# Patient Record
Sex: MALE | Race: WHITE | Employment: PART TIME | URBAN - METROPOLITAN AREA
[De-identification: names, ages, dates, MRNs, and addresses within clinical notes are randomized per-mention and may not be internally consistent; named-entity substitution may affect disease eponyms.]

---

## 2017-01-31 ENCOUNTER — ANTICOAGULATION THERAPY VISIT (OUTPATIENT)
Dept: ANTICOAGULATION | Facility: CLINIC | Age: 62
End: 2017-01-31
Payer: COMMERCIAL

## 2017-01-31 DIAGNOSIS — Z95.2 HEART VALVE REPLACED: ICD-10-CM

## 2017-01-31 DIAGNOSIS — Z79.01 LONG-TERM (CURRENT) USE OF ANTICOAGULANTS: Primary | ICD-10-CM

## 2017-01-31 LAB — INR POINT OF CARE: 2.5 (ref 0.86–1.14)

## 2017-01-31 PROCEDURE — 36416 COLLJ CAPILLARY BLOOD SPEC: CPT

## 2017-01-31 PROCEDURE — 85610 PROTHROMBIN TIME: CPT | Mod: QW

## 2017-01-31 PROCEDURE — 99207 ZZC NO CHARGE NURSE ONLY: CPT

## 2017-01-31 NOTE — PROGRESS NOTES
ANTICOAGULATION FOLLOW-UP CLINIC VISIT    Patient Name:  Matthias Cueto  Date:  1/31/2017  Contact Type:  Face to Face    SUBJECTIVE:     Patient Findings     Positives No Problem Findings           OBJECTIVE    INR PROTIME   Date Value Ref Range Status   01/31/2017 2.5* 0.86 - 1.14 Final       ASSESSMENT / PLAN  INR assessment THER    Recheck INR In: 6 WEEKS    INR Location Clinic      Anticoagulation Summary as of 1/31/2017     INR goal 2.5-3.5   Selected INR 2.5 (1/31/2017)   Maintenance plan 3.75 mg (7.5 mg x 0.5) on Tue, Sat; 7.5 mg (7.5 mg x 1) all other days   Full instructions 3.75 mg on Tue, Sat; 7.5 mg all other days   Weekly total 45 mg   No change documented Lina Benito RN   Plan last modified Lina Benito RN (4/18/2016)   Next INR check 3/14/2017   Priority INR   Target end date     Indications   Long-term (current) use of anticoagulants [Z79.01] [Z79.01]  Heart valve replaced [Z95.2] [Z95.2]         Anticoagulation Episode Summary     INR check location     Preferred lab     Send INR reminders to [facility]    Comments       Anticoagulation Care Providers     Provider Role Specialty Phone number    Porfirio Sprague MD Responsible Internal Medicine [phone]            See the Encounter Report to view Anticoagulation Flowsheet and Dosing Calendar (Go to Encounters tab in chart review, and find the Anticoagulation Therapy Visit)        Lina Benito, RN

## 2017-01-31 NOTE — MR AVS SNAPSHOT
Matthias Cueto   1/31/2017 7:15 AM   Anticoagulation Therapy Visit    Description:  61 year old male   Provider[facility]  [facility]   Department:  [facility]           INR as of 1/31/2017     Selected INR 2.5 (1/31/2017)      Anticoagulation Summary as of 1/31/2017     INR goal 2.5-3.5   Selected INR 2.5 (1/31/2017)   Full instructions 3.75 mg on Tue, Sat; 7.5 mg all other days   Next INR check 3/14/2017    Indications   Long-term (current) use of anticoagulants [Z79.01] [Z79.01]  Heart valve replaced [Z95.2] [Z95.2]         Your next Anticoagulation Clinic appointment(s)     Mar 14, 2017  7:15 AM   [facility] Visit with [facility]   [facility] ([facility])    [address] [facility] [address]  [facility] [id number] [phone]              Contact Numbers     [facility] Phone Numbers:  Anticoagulation Clinic Appointments : [phone]  Anticoagulation Nurse: [phone]         January 2017 Details    Sun Mon Tue Wed Thu Fri Sat     1               2               3               4               5               6               7                 8               9               10               11               12               13               14                 15               16               17               18               19               20               21                 22               23               24               25               26               27               28                 29               30               31      3.75 mg   See details           Date Details   01/31 This INR check               How to take your warfarin dose     To take:  3.75 mg Take 0.5 of a 7.5 mg tablet.           February 2017 Details    Sun Mon Tue Wed Thu Fri Sat        1      7.5 mg         2      7.5 mg         3      7.5 mg         4      3.75 mg           5      7.5 mg         6      7.5 mg         7      3.75 mg         8       7.5 mg         9      7.5 mg         10      7.5 mg         11      3.75 mg           12      7.5 mg         13      7.5 mg         14      3.75 mg         15      7.5 mg         16      7.5 mg         17      7.5 mg         18      3.75 mg           19      7.5 mg         20      7.5 mg         21      3.75 mg         22      7.5 mg         23      7.5 mg         24      7.5 mg         25      3.75 mg           26      7.5 mg         27      7.5 mg         28      3.75 mg              Date Details   No additional details            How to take your warfarin dose     To take:  3.75 mg Take 0.5 of a 7.5 mg tablet.    To take:  7.5 mg Take 1 of the 7.5 mg tablets.           March 2017 Details    Sun Mon Tue Wed Thu Fri Sat        1      7.5 mg         2      7.5 mg         3      7.5 mg         4      3.75 mg           5      7.5 mg         6      7.5 mg         7      3.75 mg         8      7.5 mg         9      7.5 mg         10      7.5 mg         11      3.75 mg           12      7.5 mg         13      7.5 mg         14            15               16               17               18                 19               20               21               22               23               24               25                 26               27               28               29               30               31                 Date Details   No additional details    Date of next INR:  3/14/2017         How to take your warfarin dose     To take:  3.75 mg Take 0.5 of a 7.5 mg tablet.    To take:  7.5 mg Take 1 of the 7.5 mg tablets.

## 2017-03-14 ENCOUNTER — ANTICOAGULATION THERAPY VISIT (OUTPATIENT)
Dept: ANTICOAGULATION | Facility: CLINIC | Age: 62
End: 2017-03-14
Payer: COMMERCIAL

## 2017-03-14 DIAGNOSIS — Z79.01 LONG-TERM (CURRENT) USE OF ANTICOAGULANTS: ICD-10-CM

## 2017-03-14 DIAGNOSIS — Z95.2 HEART VALVE REPLACED: ICD-10-CM

## 2017-03-14 LAB — INR POINT OF CARE: 3.9 (ref 0.86–1.14)

## 2017-03-14 PROCEDURE — 36416 COLLJ CAPILLARY BLOOD SPEC: CPT

## 2017-03-14 PROCEDURE — 85610 PROTHROMBIN TIME: CPT | Mod: QW

## 2017-03-14 PROCEDURE — 99207 ZZC NO CHARGE NURSE ONLY: CPT

## 2017-03-14 NOTE — PROGRESS NOTES
ANTICOAGULATION FOLLOW-UP CLINIC VISIT    Patient Name:  Matthias Cueto  Date:  3/14/2017  Contact Type:  Face to Face    SUBJECTIVE:     Patient Findings     Positives Change in diet/appetite (Pt reports decreased green intake this week, will resume usual diet)           OBJECTIVE    INR Protime   Date Value Ref Range Status   03/14/2017 3.9 (A) 0.86 - 1.14 Final       ASSESSMENT / PLAN  INR assessment SUPRA    Recheck INR In: 3 WEEKS    INR Location Clinic      Anticoagulation Summary as of 3/14/2017     INR goal 2.5-3.5   Today's INR 3.9!   Maintenance plan 3.75 mg (7.5 mg x 0.5) on Tue, Sat; 7.5 mg (7.5 mg x 1) all other days   Full instructions 3.75 mg on Tue, Sat; 7.5 mg all other days   Weekly total 45 mg   No change documented Lina Benito RN   Plan last modified Lina Benito RN (4/18/2016)   Next INR check 4/4/2017   Priority INR   Target end date     Indications   Long-term (current) use of anticoagulants [Z79.01] [Z79.01]  Heart valve replaced [Z95.2] [Z95.2]         Anticoagulation Episode Summary     INR check location     Preferred lab     Send INR reminders to [facility]    Comments       Anticoagulation Care Providers     Provider Role Specialty Phone number    Porfirio Sprague MD [facility] Internal Medicine [phone]            See the Encounter Report to view Anticoagulation Flowsheet and Dosing Calendar (Go to Encounters tab in chart review, and find the Anticoagulation Therapy Visit)        Lina Benito RN

## 2017-03-14 NOTE — MR AVS SNAPSHOT
Matthias Cueto   3/14/2017 7:15 AM   Anticoagulation Therapy Visit    Description:  61 year old male   Provider[facility]  [facility]   Department:  [facility]           INR as of 3/14/2017     Today's INR 3.9!      Anticoagulation Summary as of 3/14/2017     INR goal 2.5-3.5   Today's INR 3.9!   Full instructions 3.75 mg on Tue, Sat; 7.5 mg all other days   Next INR check 4/4/2017    Indications   Long-term (current) use of anticoagulants [Z79.01] [Z79.01]  Heart valve replaced [Z95.2] [Z95.2]         Your next Anticoagulation Clinic appointment(s)     Apr 04, 2017  7:15 AM CDT   [facility] with [facility]   [facility]    [address] [facility] [address]  [facility] [address] [phone]              Contact Numbers     [facility] Phone Numbers:  Anticoagulation Clinic Appointments : [phone]  Anticoagulation Nurse: [phone]         March 2017 Details    Sun Mon Tue Wed Thu Fri Sat        1               2               3               4                 5               6               7               8               9               10               11                 12               13               14      3.75 mg   See details      15      7.5 mg         16      7.5 mg         17      7.5 mg         18      3.75 mg           19      7.5 mg         20      7.5 mg         21      3.75 mg         22      7.5 mg         23      7.5 mg         24      7.5 mg         25      3.75 mg           26      7.5 mg         27      7.5 mg         28      3.75 mg         29      7.5 mg         30      7.5 mg         31      7.5 mg           Date Details   03/14 This INR check               How to take your warfarin dose     To take:  3.75 mg Take 0.5 of a 7.5 mg tablet.    To take:  7.5 mg Take 1 of the 7.5 mg tablets.           April 2017 Details    Sun Mon Tue Wed Thu Fri Sat           1      3.75 mg           2       7.5 mg         3      7.5 mg         4            5               6               7               8                 9               10               11               12               13               14               15                 16               17               18               19               20               21               22                 23               24               25               26               27               28               29                 30                      Date Details   No additional details    Date of next INR:  4/4/2017         How to take your warfarin dose     To take:  3.75 mg Take 0.5 of a 7.5 mg tablet.    To take:  7.5 mg Take 1 of the 7.5 mg tablets.

## 2017-04-04 ENCOUNTER — ANTICOAGULATION THERAPY VISIT (OUTPATIENT)
Dept: ANTICOAGULATION | Facility: CLINIC | Age: 62
End: 2017-04-04
Payer: COMMERCIAL

## 2017-04-04 DIAGNOSIS — Z79.01 LONG-TERM (CURRENT) USE OF ANTICOAGULANTS: ICD-10-CM

## 2017-04-04 DIAGNOSIS — Z95.2 HEART VALVE REPLACED: ICD-10-CM

## 2017-04-04 LAB — INR POINT OF CARE: 2.7 (ref 0.86–1.14)

## 2017-04-04 PROCEDURE — 99207 ZZC NO CHARGE NURSE ONLY: CPT

## 2017-04-04 PROCEDURE — 85610 PROTHROMBIN TIME: CPT | Mod: QW

## 2017-04-04 PROCEDURE — 36416 COLLJ CAPILLARY BLOOD SPEC: CPT

## 2017-04-04 NOTE — PROGRESS NOTES
ANTICOAGULATION FOLLOW-UP CLINIC VISIT    Patient Name:  Matthias Cueto  Date:  4/4/2017  Contact Type:  Face to Face    SUBJECTIVE:     Patient Findings     Positives No Problem Findings           OBJECTIVE    INR Protime   Date Value Ref Range Status   04/04/2017 2.7 (A) 0.86 - 1.14 Final       ASSESSMENT / PLAN  INR assessment THER    Recheck INR In: 6 WEEKS    INR Location Clinic      Anticoagulation Summary as of 4/4/2017     INR goal 2.5-3.5   Today's INR 2.7   Maintenance plan 3.75 mg (7.5 mg x 0.5) on Tue, Sat; 7.5 mg (7.5 mg x 1) all other days   Full instructions 3.75 mg on Tue, Sat; 7.5 mg all other days   Weekly total 45 mg   No change documented Lina Benito RN   Plan last modified Lina Benito RN (4/18/2016)   Next INR check 5/16/2017   Priority INR   Target end date     Indications   Long-term (current) use of anticoagulants [Z79.01] [Z79.01]  Heart valve replaced [Z95.2] [Z95.2]         Anticoagulation Episode Summary     INR check location     Preferred lab     Send INR reminders to [facility]    Comments       Anticoagulation Care Providers     Provider Role Specialty Phone number    Porfirio Sprague MD [facility] Internal Medicine [phone]            See the Encounter Report to view Anticoagulation Flowsheet and Dosing Calendar (Go to Encounters tab in chart review, and find the Anticoagulation Therapy Visit)        Lina Benito RN

## 2017-04-04 NOTE — MR AVS SNAPSHOT
Matthias Cueto   4/4/2017 7:15 AM   Anticoagulation Therapy Visit    Description:  61 year old male   Provider[facility]  [facility]   Department:  [facility]           INR as of 4/4/2017     Today's INR 2.7      Anticoagulation Summary as of 4/4/2017     INR goal 2.5-3.5   Today's INR 2.7   Full instructions 3.75 mg on Tue, Sat; 7.5 mg all other days   Next INR check 5/16/2017    Indications   Long-term (current) use of anticoagulants [Z79.01] [Z79.01]  Heart valve replaced [Z95.2] [Z95.2]         Your next Anticoagulation Clinic appointment(s)     May 16, 2017  7:15 AM CDT   [facility] with [facility]   [facility]    [address] [facility] [address]  [facility] [address] [phone]              Contact Numbers     [facility] Phone Numbers:  Anticoagulation Clinic Appointments : [phone]  Anticoagulation Nurse: [phone]         April 2017 Details    Sun Mon Tue Wed Thu Fri Sat           1                 2               3               4      3.75 mg   See details      5      7.5 mg         6      7.5 mg         7      7.5 mg         8      3.75 mg           9      7.5 mg         10      7.5 mg         11      3.75 mg         12      7.5 mg         13      7.5 mg         14      7.5 mg         15      3.75 mg           16      7.5 mg         17      7.5 mg         18      3.75 mg         19      7.5 mg         20      7.5 mg         21      7.5 mg         22      3.75 mg           23      7.5 mg         24      7.5 mg         25      3.75 mg         26      7.5 mg         27      7.5 mg         28      7.5 mg         29      3.75 mg           30      7.5 mg                Date Details   04/04 This INR check               How to take your warfarin dose     To take:  3.75 mg Take 0.5 of a 7.5 mg tablet.    To take:  7.5 mg Take 1 of the 7.5 mg tablets.           May 2017 Details    Sun Mon Tue Wed Thu Fri Sat       1      7.5 mg         2      3.75 mg         3      7.5 mg         4      7.5 mg         5      7.5 mg         6      3.75 mg           7      7.5 mg         8      7.5 mg         9      3.75 mg         10      7.5 mg         11      7.5 mg         12      7.5 mg         13      3.75 mg           14      7.5 mg         15      7.5 mg         16            17               18               19               20                 21               22               23               24               25               26               27                 28               29               30               31                   Date Details   No additional details    Date of next INR:  5/16/2017         How to take your warfarin dose     To take:  3.75 mg Take 0.5 of a 7.5 mg tablet.    To take:  7.5 mg Take 1 of the 7.5 mg tablets.

## 2017-04-26 DIAGNOSIS — R25.1 TREMOR: ICD-10-CM

## 2017-04-26 DIAGNOSIS — E63.9 NUTRITION DISORDER: ICD-10-CM

## 2017-04-26 RX ORDER — GABAPENTIN 600 MG/1
600 TABLET ORAL 4 TIMES DAILY
Qty: 360 TABLET | Refills: 4 | Status: SHIPPED | OUTPATIENT
Start: 2017-04-26

## 2017-04-26 NOTE — TELEPHONE ENCOUNTER
Gabapentin      Last Written Prescription Date:  05/11/16  Last Fill Quantity: 360,   # refills: 4  Last Office Visit with [facility] or  Health prescribing provider: 06/06/16  Future Office visit:       Routing refill request to provider for review/approval because:  Drug not on the [facility] or [facility] refill protocol or controlled substance    Folbic      Last Written Prescription Date: 05/03/16  Last Fill Quantity: 90,  # refills: 4   Last Office Visit with [facility] or  [facility] prescribing provider: 06/06/16

## 2017-05-16 ENCOUNTER — ANTICOAGULATION THERAPY VISIT (OUTPATIENT)
Dept: ANTICOAGULATION | Facility: CLINIC | Age: 62
End: 2017-05-16
Payer: COMMERCIAL

## 2017-05-16 DIAGNOSIS — Z95.2 HEART VALVE REPLACED: ICD-10-CM

## 2017-05-16 DIAGNOSIS — I05.9 MITRAL VALVE DISORDER: ICD-10-CM

## 2017-05-16 DIAGNOSIS — Z79.01 LONG-TERM (CURRENT) USE OF ANTICOAGULANTS: ICD-10-CM

## 2017-05-16 LAB — INR POINT OF CARE: 3.3 (ref 0.86–1.14)

## 2017-05-16 PROCEDURE — 85610 PROTHROMBIN TIME: CPT | Mod: QW

## 2017-05-16 PROCEDURE — 36416 COLLJ CAPILLARY BLOOD SPEC: CPT

## 2017-05-16 PROCEDURE — 99207 ZZC NO CHARGE NURSE ONLY: CPT

## 2017-05-16 RX ORDER — WARFARIN SODIUM 7.5 MG/1
TABLET ORAL
Qty: 90 TABLET | Refills: 1 | Status: SHIPPED | OUTPATIENT
Start: 2017-05-16 | End: 2017-12-07

## 2017-05-16 NOTE — MR AVS SNAPSHOT
Matthias Cueto   5/16/2017 7:15 AM   Anticoagulation Therapy Visit    Description:  61 year old male   Provider[facility]  [facility]   Department:  [facility]           INR as of 5/16/2017     Today's INR 3.3      Anticoagulation Summary as of 5/16/2017     INR goal 2.5-3.5   Today's INR 3.3   Full instructions 3.75 mg on Tue, Sat; 7.5 mg all other days   Next INR check 6/27/2017    Indications   Long-term (current) use of anticoagulants [Z79.01] [Z79.01]  Heart valve replaced [Z95.2] [Z95.2]         Your next Anticoagulation Clinic appointment(s)     Jun 27, 2017  7:15 AM CDT   [facility] with [facility]   [facility]    [address] [facility] [address]  [facility] [address] [phone]              Contact Numbers     [facility] Phone Numbers:  Anticoagulation Clinic Appointments : [phone]  Anticoagulation Nurse: [phone]         May 2017 Details    Sun Mon Tue Wed Thu Fri Sat      1               2               3               4               5               6                 7               8               9               10               11               12               13                 14               15               16      3.75 mg   See details      17      7.5 mg         18      7.5 mg         19      7.5 mg         20      3.75 mg           21      7.5 mg         22      7.5 mg         23      3.75 mg         24      7.5 mg         25      7.5 mg         26      7.5 mg         27      3.75 mg           28      7.5 mg         29      7.5 mg         30      3.75 mg         31      7.5 mg             Date Details   05/16 This INR check               How to take your warfarin dose     To take:  3.75 mg Take 0.5 of a 7.5 mg tablet.    To take:  7.5 mg Take 1 of the 7.5 mg tablets.           June 2017 Details    Sun Mon Tue Wed Thu Fri Sat         1      7.5 mg         2      7.5 mg         3       3.75 mg           4      7.5 mg         5      7.5 mg         6      3.75 mg         7      7.5 mg         8      7.5 mg         9      7.5 mg         10      3.75 mg           11      7.5 mg         12      7.5 mg         13      3.75 mg         14      7.5 mg         15      7.5 mg         16      7.5 mg         17      3.75 mg           18      7.5 mg         19      7.5 mg         20      3.75 mg         21      7.5 mg         22      7.5 mg         23      7.5 mg         24      3.75 mg           25      7.5 mg         26      7.5 mg         27            28               29               30                 Date Details   No additional details    Date of next INR:  6/27/2017         How to take your warfarin dose     To take:  3.75 mg Take 0.5 of a 7.5 mg tablet.    To take:  7.5 mg Take 1 of the 7.5 mg tablets.

## 2017-05-16 NOTE — PROGRESS NOTES
ANTICOAGULATION FOLLOW-UP CLINIC VISIT    Patient Name:  Matthias Cueto  Date:  5/16/2017  Contact Type:  Face to Face    SUBJECTIVE:     Patient Findings     Positives No Problem Findings           OBJECTIVE    INR Protime   Date Value Ref Range Status   05/16/2017 3.3 (A) 0.86 - 1.14 Final       ASSESSMENT / PLAN  INR assessment THER    Recheck INR In: 6 WEEKS    INR Location Clinic      Anticoagulation Summary as of 5/16/2017     INR goal 2.5-3.5   Today's INR 3.3   Maintenance plan 3.75 mg (7.5 mg x 0.5) on Tue, Sat; 7.5 mg (7.5 mg x 1) all other days   Full instructions 3.75 mg on Tue, Sat; 7.5 mg all other days   Weekly total 45 mg   No change documented Lina Benito RN   Plan last modified Lina Benito RN (4/18/2016)   Next INR check 6/27/2017   Priority INR   Target end date     Indications   Long-term (current) use of anticoagulants [Z79.01] [Z79.01]  Heart valve replaced [Z95.2] [Z95.2]         Anticoagulation Episode Summary     INR check location     Preferred lab     Send INR reminders to [facility]    Comments       Anticoagulation Care Providers     Provider Role Specialty Phone number    Porfirio Sprague MD [facility] Internal Medicine [phone]            See the Encounter Report to view Anticoagulation Flowsheet and Dosing Calendar (Go to Encounters tab in chart review, and find the Anticoagulation Therapy Visit)        Lina Benito RN

## 2017-06-28 ENCOUNTER — ANTICOAGULATION THERAPY VISIT (OUTPATIENT)
Dept: ANTICOAGULATION | Facility: CLINIC | Age: 62
End: 2017-06-28
Payer: COMMERCIAL

## 2017-06-28 DIAGNOSIS — Z95.2 HEART VALVE REPLACED: ICD-10-CM

## 2017-06-28 DIAGNOSIS — Z79.01 LONG-TERM (CURRENT) USE OF ANTICOAGULANTS: ICD-10-CM

## 2017-06-28 LAB — INR POINT OF CARE: 3.6 (ref 0.86–1.14)

## 2017-06-28 PROCEDURE — 85610 PROTHROMBIN TIME: CPT | Mod: QW

## 2017-06-28 PROCEDURE — 36416 COLLJ CAPILLARY BLOOD SPEC: CPT

## 2017-06-28 PROCEDURE — 99207 ZZC NO CHARGE NURSE ONLY: CPT

## 2017-06-28 NOTE — PROGRESS NOTES
ANTICOAGULATION FOLLOW-UP CLINIC VISIT    Patient Name:  Matthias Cueto  Date:  6/28/2017  Contact Type:  Face to Face    SUBJECTIVE:     Patient Findings     Positives No Problem Findings           OBJECTIVE    INR Protime   Date Value Ref Range Status   06/28/2017 3.6 (A) 0.86 - 1.14 Final       ASSESSMENT / PLAN  INR assessment THER    Recheck INR In: 6 WEEKS    INR Location Clinic      Anticoagulation Summary as of 6/28/2017     INR goal 2.5-3.5   Today's INR 3.6!   Maintenance plan 3.75 mg (7.5 mg x 0.5) on Tue, Sat; 7.5 mg (7.5 mg x 1) all other days   Full instructions 3.75 mg on Tue, Sat; 7.5 mg all other days   Weekly total 45 mg   No change documented Ashleigh Lopez RN   Plan last modified Lina Benito RN (4/18/2016)   Next INR check 8/9/2017   Priority INR   Target end date     Indications   Long-term (current) use of anticoagulants [Z79.01] [Z79.01]  Heart valve replaced [Z95.2] [Z95.2]         Anticoagulation Episode Summary     INR check location     Preferred lab     Send INR reminders to [facility]    Comments       Anticoagulation Care Providers     Provider Role Specialty Phone number    Porfirio Sprague MD Responsible Internal Medicine [phone]            See the Encounter Report to view Anticoagulation Flowsheet and Dosing Calendar (Go to Encounters tab in chart review, and find the Anticoagulation Therapy Visit)    Dosage adjustment made based on physician directed care plan.    Ashleigh Lopez, RN

## 2017-06-28 NOTE — MR AVS SNAPSHOT
Matthias Cueto   6/28/2017 4:00 PM   Anticoagulation Therapy Visit    Description:  62 year old male   Provider[facility]  [facility]   Department:  [facility]           INR as of 6/28/2017     Today's INR 3.6!      Anticoagulation Summary as of 6/28/2017     INR goal 2.5-3.5   Today's INR 3.6!   Full instructions 3.75 mg on Tue, Sat; 7.5 mg all other days   Next INR check 8/9/2017    Indications   Long-term (current) use of anticoagulants [Z79.01] [Z79.01]  Heart valve replaced [Z95.2] [Z95.2]         Contact Numbers     [facility] Phone Numbers:  Anticoagulation Clinic Appointments : [phone]  Anticoagulation Nurse: [phone]         June 2017 Details    Sun Mon Tue Wed Thu Fri Sat         1               2               3                 4               5               6               7               8               9               10                 11               12               13               14               15               16               17                 18               19               20               21               22               23               24                 25               26               27               28      7.5 mg   See details      29      7.5 mg         30      7.5 mg           Date Details   06/28 This INR check               How to take your warfarin dose     To take:  7.5 mg Take 1 of the 7.5 mg tablets.           July 2017 Details    Sun Mon Tue Wed Thu Fri Sat           1      3.75 mg           2      7.5 mg         3      7.5 mg         4      3.75 mg         5      7.5 mg         6      7.5 mg         7      7.5 mg         8      3.75 mg           9      7.5 mg         10      7.5 mg         11      3.75 mg         12      7.5 mg         13      7.5 mg         14      7.5 mg         15      3.75 mg           16      7.5 mg         17      7.5 mg         18      3.75 mg         19      7.5 mg         20      7.5 mg         21      7.5  mg         22      3.75 mg           23      7.5 mg         24      7.5 mg         25      3.75 mg         26      7.5 mg         27      7.5 mg         28      7.5 mg         29      3.75 mg           30      7.5 mg         31      7.5 mg               Date Details   No additional details            How to take your warfarin dose     To take:  3.75 mg Take 0.5 of a 7.5 mg tablet.    To take:  7.5 mg Take 1 of the 7.5 mg tablets.           August 2017 Details    Sun Mon Tue Wed Thu Fri Sat       1      3.75 mg         2      7.5 mg         3      7.5 mg         4      7.5 mg         5      3.75 mg           6      7.5 mg         7      7.5 mg         8      3.75 mg         9            10               11               12                 13               14               15               16               17               18               19                 20               21               22               23               24               25               26                 27               28               29               30               31                  Date Details   No additional details    Date of next INR:  8/9/2017         How to take your warfarin dose     To take:  3.75 mg Take 0.5 of a 7.5 mg tablet.    To take:  7.5 mg Take 1 of the 7.5 mg tablets.

## 2017-07-05 DIAGNOSIS — E78.5 HYPERLIPIDEMIA LDL GOAL <100: ICD-10-CM

## 2017-07-05 RX ORDER — SIMVASTATIN 40 MG
40 TABLET ORAL AT BEDTIME
Qty: 30 TABLET | Refills: 0 | Status: SHIPPED | OUTPATIENT
Start: 2017-07-05 | End: 2017-07-25

## 2017-07-05 NOTE — TELEPHONE ENCOUNTER
Simvastatin     Last Written Prescription Date: 06/06/16  Last Fill Quantity: 90, # refills: 4  Last Office Visit with G, P or  Health prescribing provider: 06/06/16 [facility]  Next 5 appointments (look out 90 days)     Jul 25, 2017  7:40 AM CDT   PHYSICAL with Porfirio Sprague MD   [facility] ([facility])    [address]  [facility] [address]   [phone]                   Lab Results   Component Value Date    CHOL 135 06/06/2016     Lab Results   Component Value Date    HDL 47 06/06/2016     Lab Results   Component Value Date    LDL 61 06/06/2016     Lab Results   Component Value Date    TRIG 137 06/06/2016     Lab Results   Component Value Date    CHOLHDLRATIO 2.7 06/15/2015       Labs showing if normal/abnormal  Lab Results   Component Value Date    CHOL 135 06/06/2016    TRIG 137 06/06/2016    HDL 47 06/06/2016    LDL 61 06/06/2016    VLDL 20 06/15/2015    CHOLHDLRATIO 2.7 06/15/2015

## 2017-07-05 NOTE — TELEPHONE ENCOUNTER
Medication is being filled for 1 time refill only due to:  Patient due for Office visit and fasting labs.

## 2017-07-25 ENCOUNTER — OFFICE VISIT (OUTPATIENT)
Dept: INTERNAL MEDICINE | Facility: CLINIC | Age: 62
End: 2017-07-25
Payer: COMMERCIAL

## 2017-07-25 VITALS
HEART RATE: 78 BPM | WEIGHT: 243 LBS | OXYGEN SATURATION: 97 % | SYSTOLIC BLOOD PRESSURE: 118 MMHG | TEMPERATURE: 97.9 F | HEIGHT: 74 IN | BODY MASS INDEX: 31.18 KG/M2 | DIASTOLIC BLOOD PRESSURE: 78 MMHG

## 2017-07-25 DIAGNOSIS — Z12.5 SCREENING FOR PROSTATE CANCER: ICD-10-CM

## 2017-07-25 DIAGNOSIS — E78.5 HYPERLIPIDEMIA LDL GOAL <130: ICD-10-CM

## 2017-07-25 DIAGNOSIS — G25.2 INTENTION TREMOR: ICD-10-CM

## 2017-07-25 DIAGNOSIS — E78.5 HYPERLIPIDEMIA LDL GOAL <100: ICD-10-CM

## 2017-07-25 DIAGNOSIS — Z95.2 HEART VALVE REPLACED: ICD-10-CM

## 2017-07-25 DIAGNOSIS — Z00.00 ROUTINE GENERAL MEDICAL EXAMINATION AT A HEALTH CARE FACILITY: Primary | ICD-10-CM

## 2017-07-25 DIAGNOSIS — R13.14 PHARYNGOESOPHAGEAL DYSPHAGIA: ICD-10-CM

## 2017-07-25 DIAGNOSIS — G47.33 OSA ON CPAP: ICD-10-CM

## 2017-07-25 LAB
ALBUMIN SERPL-MCNC: 4.1 G/DL (ref 3.4–5)
ALBUMIN UR-MCNC: NEGATIVE MG/DL
ALP SERPL-CCNC: 70 U/L (ref 40–150)
ALT SERPL W P-5'-P-CCNC: 31 U/L (ref 0–70)
ANION GAP SERPL CALCULATED.3IONS-SCNC: 8 MMOL/L (ref 3–14)
APPEARANCE UR: CLEAR
AST SERPL W P-5'-P-CCNC: 23 U/L (ref 0–45)
BILIRUB SERPL-MCNC: 0.6 MG/DL (ref 0.2–1.3)
BILIRUB UR QL STRIP: NEGATIVE
BUN SERPL-MCNC: 17 MG/DL (ref 7–30)
CALCIUM SERPL-MCNC: 9.1 MG/DL (ref 8.5–10.1)
CHLORIDE SERPL-SCNC: 108 MMOL/L (ref 94–109)
CHOLEST SERPL-MCNC: 139 MG/DL
CO2 SERPL-SCNC: 24 MMOL/L (ref 20–32)
COLOR UR AUTO: YELLOW
CREAT SERPL-MCNC: 1.06 MG/DL (ref 0.66–1.25)
ERYTHROCYTE [DISTWIDTH] IN BLOOD BY AUTOMATED COUNT: 14.8 % (ref 10–15)
GFR SERPL CREATININE-BSD FRML MDRD: 70 ML/MIN/1.7M2
GLUCOSE SERPL-MCNC: 96 MG/DL (ref 70–99)
GLUCOSE UR STRIP-MCNC: NEGATIVE MG/DL
HCT VFR BLD AUTO: 45.5 % (ref 40–53)
HCV AB SERPL QL IA: NORMAL
HDLC SERPL-MCNC: 43 MG/DL
HGB BLD-MCNC: 14.9 G/DL (ref 13.3–17.7)
HGB UR QL STRIP: NEGATIVE
KETONES UR STRIP-MCNC: NEGATIVE MG/DL
LDLC SERPL CALC-MCNC: 63 MG/DL
LEUKOCYTE ESTERASE UR QL STRIP: NEGATIVE
MCH RBC QN AUTO: 30.3 PG (ref 26.5–33)
MCHC RBC AUTO-ENTMCNC: 32.7 G/DL (ref 31.5–36.5)
MCV RBC AUTO: 93 FL (ref 78–100)
NITRATE UR QL: NEGATIVE
NONHDLC SERPL-MCNC: 96 MG/DL
PH UR STRIP: 5 PH (ref 5–7)
PLATELET # BLD AUTO: 182 10E9/L (ref 150–450)
POTASSIUM SERPL-SCNC: 4.3 MMOL/L (ref 3.4–5.3)
PROT SERPL-MCNC: 7.4 G/DL (ref 6.8–8.8)
PSA SERPL-ACNC: 0.55 UG/L (ref 0–4)
RBC # BLD AUTO: 4.92 10E12/L (ref 4.4–5.9)
SODIUM SERPL-SCNC: 140 MMOL/L (ref 133–144)
SP GR UR STRIP: 1.02 (ref 1–1.03)
TRIGL SERPL-MCNC: 166 MG/DL
TSH SERPL DL<=0.005 MIU/L-ACNC: 2.99 MU/L (ref 0.4–4)
URN SPEC COLLECT METH UR: NORMAL
UROBILINOGEN UR STRIP-ACNC: 0.2 EU/DL (ref 0.2–1)
WBC # BLD AUTO: 6 10E9/L (ref 4–11)

## 2017-07-25 PROCEDURE — 99396 PREV VISIT EST AGE 40-64: CPT | Performed by: INTERNAL MEDICINE

## 2017-07-25 PROCEDURE — 36415 COLL VENOUS BLD VENIPUNCTURE: CPT | Performed by: INTERNAL MEDICINE

## 2017-07-25 PROCEDURE — 86803 HEPATITIS C AB TEST: CPT | Performed by: INTERNAL MEDICINE

## 2017-07-25 PROCEDURE — 84443 ASSAY THYROID STIM HORMONE: CPT | Performed by: INTERNAL MEDICINE

## 2017-07-25 PROCEDURE — 80061 LIPID PANEL: CPT | Performed by: INTERNAL MEDICINE

## 2017-07-25 PROCEDURE — 85027 COMPLETE CBC AUTOMATED: CPT | Performed by: INTERNAL MEDICINE

## 2017-07-25 PROCEDURE — G0103 PSA SCREENING: HCPCS | Performed by: INTERNAL MEDICINE

## 2017-07-25 PROCEDURE — 81003 URINALYSIS AUTO W/O SCOPE: CPT | Performed by: INTERNAL MEDICINE

## 2017-07-25 PROCEDURE — 99213 OFFICE O/P EST LOW 20 MIN: CPT | Mod: 25 | Performed by: INTERNAL MEDICINE

## 2017-07-25 PROCEDURE — 80053 COMPREHEN METABOLIC PANEL: CPT | Performed by: INTERNAL MEDICINE

## 2017-07-25 RX ORDER — SIMVASTATIN 40 MG
TABLET ORAL
Qty: 30 TABLET | Refills: 0 | OUTPATIENT
Start: 2017-07-25

## 2017-07-25 RX ORDER — SIMVASTATIN 40 MG
40 TABLET ORAL AT BEDTIME
Qty: 90 TABLET | Refills: 3 | Status: SHIPPED | OUTPATIENT
Start: 2017-07-25

## 2017-07-25 NOTE — TELEPHONE ENCOUNTER
Simvastatin - INTERFACE REQUEST/PT HAS OV TODAY     Last Written Prescription Date: 07/05/17  Last Fill Quantity: 30, # refills: 0  Last Office Visit with G, P or [facility] prescribing provider: 07/25/17       Lab Results   Component Value Date    CHOL 135 06/06/2016     Lab Results   Component Value Date    HDL 47 06/06/2016     Lab Results   Component Value Date    LDL 61 06/06/2016     Lab Results   Component Value Date    TRIG 137 06/06/2016     Lab Results   Component Value Date    CHOLHDLRATIO 2.7 06/15/2015       Labs showing if normal/abnormal  Lab Results   Component Value Date    CHOL 135 06/06/2016    TRIG 137 06/06/2016    HDL 47 06/06/2016    LDL 61 06/06/2016    VLDL 20 06/15/2015    CHOLHDLRATIO 2.7 06/15/2015

## 2017-07-25 NOTE — PROGRESS NOTES
SUBJECTIVE:   CC: Matthias Cueto is an 62 year old male who presents for preventative health visit.     Physical   Annual:     Getting at least 3 servings of Calcium per day::  NO    Bi-annual eye exam::  Yes    Dental care twice a year::  Yes    Sleep apnea or symptoms of sleep apnea::  Daytime drowsiness, Excessive snoring and Sleep apnea    Diet::  Other    Frequency of exercise::  4-5 days/week    Duration of exercise::  45-60 minutes    Taking medications regularly::  Yes    Medication side effects::  None    Additional concerns today::  YES          PROBLEMS TO ADD ON...    Has h/o AVR- on chronic coumadin treatment. No bleeding problems. No symptoms of dizziness, fainting, CP, palpitations.   Has concern for dysphagia, difficulty with swallowing solid foods, feeling of the food getting stuck in the throat.  Has H/O hyperlipidemia. On medical treatment and diet. No side effects. No muscle weakness, myalgias or upset stomach.   Has ANALILIA , on CPAP, good results.       Today's PHQ-2 Score: 0  PHQ-2 ( 1999 [employer]) 7/22/2017   Q1: Little interest or pleasure in doing things 0   Q2: Feeling down, depressed or hopeless 0   PHQ-2 Score 0   Q1: Little interest or pleasure in doing things Not at all   Q2: Feeling down, depressed or hopeless Not at all   PHQ-2 Score 0       Abuse: Current or Past(Physical, Sexual or Emotional)- No  Do you feel safe in your environment - Yes    Social History   Substance Use Topics     Smoking status: Never Smoker     Smokeless tobacco: Never Used     Alcohol use No     The patient does not drink >3 drinks per day nor >7 drinks per week.    Last PSA:   PSA   Date Value Ref Range Status   06/06/2016 0.56 0 - 4 ug/L Final       Reviewed orders with patient. Reviewed health maintenance and updated orders accordingly - Yes  Labs reviewed in EPIC    Reviewed and updated as needed this visit by clinical staff         Reviewed and updated as needed this visit by Provider              ROS:  C:  NEGATIVE for fever, chills, change in weight  I: NEGATIVE for worrisome rashes, moles or lesions  E: NEGATIVE for vision changes or irritation  ENT: NEGATIVE for ear, mouth and throat problems  R: NEGATIVE for significant cough or SOB  CV: NEGATIVE for chest pain, palpitations or peripheral edema  GI: NEGATIVE for nausea, abdominal pain, heartburn, or change in bowel habits   male: negative for dysuria, hematuria, decreased urinary stream, erectile dysfunction, urethral discharge  M: NEGATIVE for significant arthralgias or myalgia  N: NEGATIVE for weakness, dizziness or paresthesias  P: NEGATIVE for changes in mood or affect    OBJECTIVE:   There were no vitals taken for this visit.    EXAM:  GENERAL: healthy, alert and no distress, obese  EYES: Eyes grossly normal to inspection, PERRL and conjunctivae and sclerae normal  HENT: ear canals and TM's normal, nose and mouth without ulcers or lesions  NECK: no adenopathy, no asymmetry, masses, or scars and thyroid normal to palpation  RESP: lungs clear to auscultation - no rales, rhonchi or wheezes  CV: regular rate and rhythm, normal S1 S2, no S3 or S4, no murmur, click or rub, no peripheral edema and peripheral pulses strong  ABDOMEN: soft, nontender, no hepatosplenomegaly, no masses and bowel sounds normal  MS: no gross musculoskeletal defects noted, no edema  SKIN: no suspicious lesions or rashes  NEURO: Normal strength and tone, mentation intact and speech normal  PSYCH: mentation appears normal, affect normal/bright    ASSESSMENT/PLAN:       ICD-10-CM    1. Routine general medical examination at a health care facility Z00.00 Lipid panel reflex to direct LDL     Prostate spec antigen screen     CBC with platelets     Comprehensive metabolic panel     TSH with free T4 reflex     *UA reflex to Microscopic and Culture ([place] [facility] (except [place] and [place])   2. Hyperlipidemia LDL goal <100 E78.5 simvastatin (ZOCOR) 40 MG tablet     CBC with  "platelets     Comprehensive metabolic panel     TSH with free T4 reflex     *UA reflex to Microscopic and Culture ([place] [facility] (except [place] and [place])   3. Pharyngoesophageal dysphagia R13.14 GASTROENTEROLOGY ADULT REF PROCEDURE ONLY     CBC with platelets     Comprehensive metabolic panel     TSH with free T4 reflex     *UA reflex to Microscopic and Culture ([place] [facility] (except [place] and [place])     OFFICE/OUTPT VISIT,EST,LEVL III   4. Hyperlipidemia LDL goal <130 E78.5 Lipid panel reflex to direct LDL     OFFICE/OUTPT VISIT,EST,LEVL III   5. Intention tremor G25.2    6. ANALILIA on CPAP G47.33     Z99.89    7. Screening for prostate cancer Z12.5 Prostate spec antigen screen   8. Heart valve replaced [Z95.2] Z95.2        COUNSELING:   Reviewed preventive health counseling, as reflected in patient instructions       Regular exercise       Healthy diet/nutrition       Vision screening       Colon cancer screening       Prostate cancer screening         reports that he has never smoked. He has never used smokeless tobacco.      Estimated body mass index is 30.56 kg/(m^2) as calculated from the following:    Height as of 6/6/16: 6' 2\" (1.88 m).    Weight as of 6/6/16: 238 lb (108 kg).   Weight management plan: Discussed healthy diet and exercise guidelines and patient will follow up in 12 months in clinic to re-evaluate.    Counseling Resources:  ATP IV Guidelines  Pooled Cohorts Equation Calculator  FRAX Risk Assessment  ICSI Preventive Guidelines  Dietary Guidelines for Americans, 2010  USDA's MyPlate  ASA Prophylaxis  Lung CA Screening    Porfirio Sprague MD  [facility]  Answers for HPI/ROS submitted by the patient on 7/22/2017   PHQ-2 Score: 0    "

## 2017-07-25 NOTE — MR AVS SNAPSHOT
After Visit Summary   7/25/2017    Matthias Cueto    MRN: [id number]           Patient Information     Date Of Birth          1955        Visit Information        Provider Department      7/25/2017 7:40 AM Porfirio Sprague MD [facility]        Today's Diagnoses     Routine general medical examination at a health care facility    -  1    Hyperlipidemia LDL goal <100        Pharyngoesophageal dysphagia        Hyperlipidemia LDL goal <130        Intention tremor        ANALILIA on CPAP        Screening for prostate cancer           Follow-ups after your visit        Additional Services     GASTROENTEROLOGY ADULT REF PROCEDURE ONLY       Last Lab Result: Creatinine (mg/dL)       Date                     Value                 06/06/2016               0.96             ----------  Body mass index is 30.99 kg/(m^2).     Needed:  No  Language:  English    Patient will be contacted to schedule procedure.     Please be aware that coverage of these services is subject to the terms and limitations of your health insurance plan.  Call member services at your health plan with any benefit or coverage questions.  Any procedures must be performed at a [facility] facility OR coordinated by your clinic's referral office.    Please bring the following with you to your appointment:    (1) Any X-Rays, CTs or MRIs which have been performed.  Contact the facility where they were done to arrange for  prior to your scheduled appointment.    (2) List of current medications   (3) This referral request   (4) Any documents/labs given to you for this referral                  Who to contact     If you have questions or need follow up information about today's clinic visit or your schedule please contact [facility] directly at [phone].  Normal or non-critical lab and imaging results will be communicated to you by MyChart, letter or phone within 4 business days after the clinic  "has received the results. If you do not hear from us within 7 days, please contact the clinic through [employer] or phone. If you have a critical or abnormal lab result, we will notify you by phone as soon as possible.  Submit refill requests through [employer] or call your pharmacy and they will forward the refill request to us. Please allow 3 business days for your refill to be completed.          Additional Information About Your Visit        [employer]aitment Information     [employer] gives you secure access to your electronic health record. If you see a primary care provider, you can also send messages to your care team and make appointments. If you have questions, please call your primary care clinic.  If you do not have a primary care provider, please call [phone] and they will assist you.        Care EveryWhere ID     This is your Care EveryWhere ID. This could be used by other organizations to access your [facility] medical records  [id number]        Your Vitals Were     Pulse Temperature Height Pulse Oximetry BMI (Body Mass Index)       78 97.9  F (36.6  C) (Oral) 6' 2.25\" (1.886 m) 97% 30.99 kg/m2        Blood Pressure from Last 3 Encounters:   07/25/17 118/78   06/06/16 120/72   01/18/16 (!) 159/95    Weight from Last 3 Encounters:   07/25/17 243 lb (110.2 kg)   06/06/16 238 lb (108 kg)   01/18/16 235 lb (106.6 kg)              We Performed the Following     *UA reflex to Microscopic and Culture ([place] and [facility]] (except [place] and [place])     CBC with platelets     Comprehensive metabolic panel     GASTROENTEROLOGY ADULT REF PROCEDURE ONLY     Lipid panel reflex to direct LDL     Prostate spec antigen screen     TSH with free T4 reflex          Where to get your medicines      [facility] [place], [place] - [address] AT [facility]  [address], [facility] [address]     Phone:  [phone]     simvastatin 40 MG " tablet          Primary Care Provider Office Phone # Fax #    Porfirio Sprague [phone]       [facility] [address]  [facility] [address]        Equal Access to Services     CELINE ROJO : Hadedy candido mcqueen francisco javiero Soariadneali, waaxda luqadaha, qaybta kaalmada adeegyada, waxed herbertn oneal carcamo jerrell mendiola. So [facility] [phone].    ATENCIÓN: Si habla español, tiene a de los santos disposición servicios gratuitos de asistencia lingüística. Llame al [phone].    We comply with applicable federal civil rights laws and [place] laws. We do not discriminate on the basis of race, color, national origin, age, disability sex, sexual orientation or gender identity.            Thank you!     Thank you for choosing [facility]  for your care. Our goal is always to provide you with excellent care. Hearing back from our patients is one way we can continue to improve our services. Please take a few minutes to complete the written survey that you may receive in the mail after your visit with us. Thank you!             Your Updated Medication List - Protect others around you: Learn how to safely use, store and throw away your medicines at www.disposemymeds.org.          This list is accurate as of: 7/25/17  8:07 AM.  Always use your most recent med list.                   Brand Name Dispense Instructions for use Diagnosis    aspirin 81 MG tablet      1 TABLET DAILY    Mixed hyperlipidemia       fa-pyridoxine-cyancobalamin 2.5-25-2 MG Tabs per tablet    FOLBIC    90 each    Take 1 tablet by mouth daily    Nutrition disorder       FISH OIL      1 tab tid        gabapentin 600 MG tablet    NEURONTIN    360 tablet    Take 1 tablet (600 mg) by mouth 4 times daily    Tremor       GLUCOSAMINE CHONDR COMPLEX PO      Take  by mouth.        MULTI vitamin  MENS Tabs      1 TABLET DAILY        simvastatin 40 MG tablet    ZOCOR    90 tablet    Take 1 tablet (40 mg) by mouth At Bedtime at  bedtime.    Hyperlipidemia LDL goal <100       warfarin 7.5 MG tablet    COUMADIN    90 tablet    Take 1 tablet (7.5 mg) daily except take 1/2 tablet (3.75 mg) on Tues and  Sat, or as directed    Mitral valve disorder

## 2017-07-26 ENCOUNTER — TELEPHONE (OUTPATIENT)
Dept: INTERNAL MEDICINE | Facility: CLINIC | Age: 62
End: 2017-07-26

## 2017-07-26 DIAGNOSIS — Z95.2 HEART VALVE REPLACED: Primary | ICD-10-CM

## 2017-07-26 NOTE — TELEPHONE ENCOUNTER
Need to be on Lovenox to overlap when off Coumadin. If he has a day set up for GI will call in Lovenox.

## 2017-07-26 NOTE — TELEPHONE ENCOUNTER
Reason for Call:  Other call back    Detailed comments: Pt was referred to Gastro for Upper Endoscopy.  Gastro dept states pt needs to be off Warafin for 5 days prior to procedure.  Pt inquiring if that is ok.  Has had procedures before and was put on Levenox in the past    Phone Number Patient can be reached at: Cell number on file:    Telephone Information:   Mobile [phone]       Best Time: anytime    Can we leave a detailed message on this number? YES    Call taken on 7/26/2017 at 4:06 PM by JARROD LLOYD

## 2017-07-26 NOTE — TELEPHONE ENCOUNTER
Called pt, relay MD message below. Reports he'll call to schedule upper endoscopy tomorrow and will let clinic know it is tomorrow.

## 2017-07-27 NOTE — TELEPHONE ENCOUNTER
Pt calling back.  Has endoscopy scheduled 8-9-17 @ 8:45a.    Please fax in rx for Lovenox inj.  Pt states he has had this before and does not need injection teaching.    Please advise, thanks.  (Ok to hold for PCP.)

## 2017-07-28 NOTE — TELEPHONE ENCOUNTER
Will call in Lovenox.   To stop Coumadin 5 days prior to colonoscopy.   Start Lovenox 3 days prior and hold on the day of procedure, take for 3 more days after procedure.

## 2017-07-31 ENCOUNTER — TELEPHONE (OUTPATIENT)
Dept: INTERNAL MEDICINE | Facility: CLINIC | Age: 62
End: 2017-07-31

## 2017-07-31 NOTE — TELEPHONE ENCOUNTER
Instructions were given when Lovenox was prescribed    Will call in Lovenox.   To stop Coumadin 5 days prior to colonoscopy.   Start Lovenox 3 days prior and hold on the day of procedure, take for 3 more days after procedure.  Restart Coumadin on the day post procedure if OK with GI.

## 2017-07-31 NOTE — TELEPHONE ENCOUNTER
Reason for Call:  Other call back    Detailed comments: Pt will be have endoscopy procedure on 8/9/17. He is currently taking Warfarin but was prescribed Lovenox to start taking before procedure. He was not given any instructions as to when to stop Warfarin and what dosages of Lovenox he should be taking. Please advise. Thanks    Phone Number Patient can be reached at: Home number on file [phone] (home)    Best Time: With the next 2 hour    Can we leave a detailed message on this number? YES, ok to leave detailed message    Call taken on 7/31/2017 at 10:38 AM by Vivien Clark

## 2017-08-15 ENCOUNTER — HOSPITAL ENCOUNTER (OUTPATIENT)
Facility: CLINIC | Age: 62
Discharge: HOME | End: 2017-08-15
Attending: INTERNAL MEDICINE | Admitting: INTERNAL MEDICINE
Payer: COMMERCIAL

## 2017-08-15 VITALS
RESPIRATION RATE: 14 BRPM | OXYGEN SATURATION: 95 % | SYSTOLIC BLOOD PRESSURE: 113 MMHG | DIASTOLIC BLOOD PRESSURE: 59 MMHG

## 2017-08-15 LAB
INR PPP: 0.97 (ref 0.86–1.14)
UPPER GI ENDOSCOPY: NORMAL

## 2017-08-15 PROCEDURE — 36415 COLL VENOUS BLD VENIPUNCTURE: CPT | Performed by: INTERNAL MEDICINE

## 2017-08-15 PROCEDURE — 43235 EGD DIAGNOSTIC BRUSH WASH: CPT | Performed by: INTERNAL MEDICINE

## 2017-08-15 PROCEDURE — 85610 PROTHROMBIN TIME: CPT | Performed by: INTERNAL MEDICINE

## 2017-08-15 PROCEDURE — 25000125 ZZHC RX 250: Performed by: INTERNAL MEDICINE

## 2017-08-15 PROCEDURE — 40000104 ZZH STATISTIC MODERATE SEDATION < 10 MIN: Performed by: INTERNAL MEDICINE

## 2017-08-15 PROCEDURE — 25000128 H RX IP 250 OP 636: Performed by: INTERNAL MEDICINE

## 2017-08-15 RX ORDER — NALOXONE HYDROCHLORIDE 0.4 MG/ML
.1-.4 INJECTION, SOLUTION INTRAMUSCULAR; INTRAVENOUS; SUBCUTANEOUS
Status: DISCONTINUED | OUTPATIENT
Start: 2017-08-15 | End: 2017-08-15 | Stop reason: HOSPADM

## 2017-08-15 RX ORDER — LIDOCAINE 40 MG/G
CREAM TOPICAL
Status: DISCONTINUED | OUTPATIENT
Start: 2017-08-15 | End: 2017-08-15 | Stop reason: HOSPADM

## 2017-08-15 RX ORDER — FENTANYL CITRATE 50 UG/ML
INJECTION, SOLUTION INTRAMUSCULAR; INTRAVENOUS PRN
Status: DISCONTINUED | OUTPATIENT
Start: 2017-08-15 | End: 2017-08-15 | Stop reason: HOSPADM

## 2017-08-15 RX ORDER — ONDANSETRON 2 MG/ML
4 INJECTION INTRAMUSCULAR; INTRAVENOUS EVERY 6 HOURS PRN
Status: DISCONTINUED | OUTPATIENT
Start: 2017-08-15 | End: 2017-08-15 | Stop reason: HOSPADM

## 2017-08-15 RX ORDER — ONDANSETRON 4 MG/1
4 TABLET, ORALLY DISINTEGRATING ORAL EVERY 6 HOURS PRN
Status: DISCONTINUED | OUTPATIENT
Start: 2017-08-15 | End: 2017-08-15 | Stop reason: HOSPADM

## 2017-08-15 RX ORDER — FLUMAZENIL 0.1 MG/ML
0.2 INJECTION, SOLUTION INTRAVENOUS
Status: DISCONTINUED | OUTPATIENT
Start: 2017-08-15 | End: 2017-08-15 | Stop reason: HOSPADM

## 2017-08-15 RX ORDER — ONDANSETRON 2 MG/ML
4 INJECTION INTRAMUSCULAR; INTRAVENOUS
Status: DISCONTINUED | OUTPATIENT
Start: 2017-08-15 | End: 2017-08-15 | Stop reason: HOSPADM

## 2017-08-15 NOTE — H&P
Pre-Endoscopy History and Physical     Matthias Cueto MRN# [id number]   YOB: 1955 Age: 62 year old     Date of Procedure: 8/15/2017  Primary care provider: Porfirio Sprague  Type of Endoscopy: Gastroscopy with possible biopsy, possible dilation  Reason for Procedure: dysphagia  Type of Anesthesia Anticipated: Conscious Sedation    HPI:    Matthias is a 62 year old male who will be undergoing the above procedure.      A history and physical has been performed. The patient's medications and allergies have been reviewed. The risks and benefits of the procedure and the sedation options and risks were discussed with the patient.  All questions were answered and informed consent was obtained.      He denies a personal or family history of anesthesia complications or bleeding disorders.     Patient Active Problem List   Diagnosis     Mitral valve disorder     Long term current use of anticoagulant therapy     HYPERLIPIDEMIA LDL GOAL <130     Advanced directives, counseling/discussion     Intention tremor     Long-term (current) use of anticoagulants [Z79.01]     Heart valve replaced [Z95.2]     ANALILIA on CPAP        Past Medical History:   Diagnosis Date     Arthritis      Chronic kidney disease      Hemorrhoids      Mitral valve disorder      ANALILIA on CPAP         Past Surgical History:   Procedure Laterality Date     C CORTISONE INJECTION  5/2015    in back     C NONSPECIFIC PROCEDURE       C NONSPECIFIC PROCEDURE      S/P Vasectomy     C NONSPECIFIC PROCEDURE  05/00     CARDIAC SURGERY       Left and Right Foot toe surgery       MV replacement with [facility]  5/2000     VASECTOMY         Social History   Substance Use Topics     Smoking status: Never Smoker     Smokeless tobacco: Never Used     Alcohol use No       Family History   Problem Relation Age of Onset     Cardiovascular Father      MVP     Blood Disease Mother      CLL age 79       Prior to Admission medications    Medication Sig Start Date End  "Date Taking? Authorizing Provider   enoxaparin (LOVENOX) 100 MG/ML injection Inject 1 mL (100 mg) Subcutaneous every 12 hours 7/28/17  Yes Porfirio Sprague MD   simvastatin (ZOCOR) 40 MG tablet Take 1 tablet (40 mg) by mouth At Bedtime at bedtime. 7/25/17  Yes Porfirio Sprague MD   warfarin (COUMADIN) 7.5 MG tablet Take 1 tablet (7.5 mg) daily except take 1/2 tablet (3.75 mg) on Tues and  Sat, or as directed 5/16/17  Yes Porfirio Sprague MD   gabapentin (NEURONTIN) 600 MG tablet Take 1 tablet (600 mg) by mouth 4 times daily 4/26/17  Yes Porfirio Sprague MD   fa-pyridoxine-cyancobalamin (FOLBIC) 2.5-25-2 MG TABS per tablet Take 1 tablet by mouth daily 4/26/17  Yes Porfirio Sprague MD   Glucosamine-Chondroitin (GLUCOSAMINE CHONDR COMPLEX PO) Take  by mouth.   Yes Reported, Patient   MULTI VITAMIN MENS OR TABS 1 TABLET DAILY   Yes Reported, Patient   FISH OIL 1 tab tid   Yes Reported, Patient   ASPIRIN 81 MG OR TABS 1 TABLET DAILY   Yes Reported, Patient       No Known Allergies     REVIEW OF SYSTEMS:   5 point ROS negative except as noted above in HPI, including Gen., Resp., CV, GI &  system review.    PHYSICAL EXAM:   There were no vitals taken for this visit. Estimated body mass index is 30.99 kg/(m^2) as calculated from the following:    Height as of 7/25/17: 1.886 m (6' 2.25\").    Weight as of 7/25/17: 110.2 kg (243 lb).   GENERAL APPEARANCE: alert, and oriented  MENTAL STATUS: alert  AIRWAY EXAM: Mallampatti Class II (visualization of the soft palate, fauces, and uvula)  RESP: lungs clear to auscultation - no rales, rhonchi or wheezes  CV: regular rates and rhythm  DIAGNOSTICS:    Not indicated    IMPRESSION   ASA Class 2 - Mild systemic disease    PLAN:   Plan for Gastroscopy with possible biopsy, possible dilation. We discussed the risks, benefits and alternatives and the patient wished to proceed.    The above has been forwarded to the consulting provider.      Signed Electronically by: Matthias" WALDEMAR Mason  August 15, 2017

## 2017-08-15 NOTE — LETTER
Thank you for choosing [facility]. You are scheduled for the following service.   We will draw and INR when you arrive for your procedrue  Date:   8-9-17      Procedure: UPPER ENDOSCOPY-EGD  Doctor: Dante           Arrival Time:  0745  *check in at Emergency/Endoscopy desk*  Procedure Time: 0845     Location:   [facility]        Endoscopy Department, First Floor (Enter through ER Doors) *         [address] [place], [place] [address]      [phone] or [phone] () to reschedule        PRE-PROCEDURE CHECKLIST    If you have diabetes, ask your regular doctor for diet and medication restrictions.  If you take any antiplatelet or anticoagulant medications (such as Coumadin, Lovenox, Plavix, etc.) and have not already discussed this, please call your primary physician for advice on holding this medication.  If you take Aspirin, you may continue to do so.  If you are or may be pregnant, please discuss the risks and benefits of this procedure with your doctor.  You must arrange for a ride for the day of your exam. If you fail to arrange transportation with a responsible adult, your procedure will need to be cancelled and rescheduled. Taxi, bus and medical transport are not acceptable unless you have a responsible adult that you know & trust with you. Please arrange for this  to be able to pick you up in our department, approximately one hour after your scheduled procedure, if they are not able to stay with you.      Canceling or rescheduling   If you must cancel or reschedule your appointment, please call [phone] as soon as possible.      Upper Endoscopy or Esophagogastroduodenoscopy (EGD) is a test performed to evaluate symptoms of persistent abdominal pain, nausea, vomiting and difficulty swallowing. It may also be used to treat various conditions of the upper gastrointestinal tract, such as bleeding, narrowing or abnormal growths.     What  happens during an upper endoscopy?  On the day of your procedure, plan to spend up to one and a half hour after your arrival at the endoscopy center. The exam itself takes about 5 to 10 minutes.    Before the exam:  - You will change into a gown.   - Your medical history and medication list will be reviewed with you, unless it has already been done over the phone.   - A nurse will insert an intravenous (IV) line into your hand or arm.  - The doctor will talk to you and give you a consent form to sign.    During the exam:  - Medicine will be given through the IV line to help you relax and feel comfortable.   - Your heart rate and oxygen levels will be monitored. If your blood pressure is low, you may be given fluids through the IV line.   - The doctor will insert a flexible, hollow tube, called an endoscope, into your mouth and will advance it slowly through the esophagus, stomach and duodenum (the first part of your small intestine).   - You may have a feeling of pressure or fullness.   - If you have difficulty swallowing, and the doctor finds a narrowing in your esophagus, it may be possible for the area to be expanded-dilated during the exam.   - If abnormal tissue is found, the doctor may remove it through the endoscope (biopsy it) for closer examination. The tissue removal is painless.    After the exam:  - Any tissue samples removed during the exam will be sent to a lab for evaluation. It may take 5 to 7 working days for you to be notified of the results  - The doctor will prepare a full report for the physician who referred you for the upper endoscopy.   - The doctor will talk with you about the initial results of your exam.   - You may feel bloated after the procedure. That is normal and should not last long.   - Your throat may feel sore for a short time.   - Following the exam, you may resume your normal diet. Avoid alcohol until the next day.   - You may resume your regular activities the day after the  procedure.   - Medication given during the exam will prohibit you from driving for the rest of the day.  - A nurse will provide you with complete discharge instructions before you leave the endoscopy center. Be sure to ask the nurse for specific instructions if you take blood thinners such as Aspirin , Coumadin , Lovenox , Plavix , etc.       PREPARATION    To ensure a successful exam, please follow all instructions carefully.      The night before your exam:    STOP eating solid foods at 11:45 pm.     Clear liquids are okay to drink (examples: Gatorade , apple juice, clear broth, etc.).     DO NOT drink red liquids or alcoholic beverages.    The day of your exam:    STOP drinking clear liquids 4 hours before your exam.     You may take your usual medications with 4 oz. of water, but it needs to be at least 4 hours prior to your procedure.    When you leave for the procedure:    Bring a list of all of your current medications, including any allergy or over-the-counter medications, unless you have already reviewed that with an Endoscopy RN over the phone.     Bring a photo ID as well as up-to-date insurance information, such as your insurance card and any referral forms that might be required by your payer.       DIRECTIONS TO THE ENDOSCOPY DEPARTMENT    From the north ([facility][1])  Take [address]W south, exit on [address] [address]. Get into the left hand fadia, turn left (east), go one-half mile to [address] and turn left. Go north to the first stoplight, take a right on [address] and follow it to the Emergency entrance.  From the south ([facility])  Take 35N to the 35E split and exit on [address] . On [address] , turn left (west) to [address]. Turn right (north) on [address]. Go north to the first stoplight, take a right on [address] and follow it to the Emergency entrance.  From the east via 35E ([facility])  Take [address]E south to [address] [address] exit.  Turn right on [address]. Go west to [address]. Turn right (north) on [address]. Go to the first stoplight, take a right and follow on [address] to the Emergency entrance.  From the east via [address] ([facility])  Take [address] to [address]. Turn left (south) on [address] to [address]. Turn left (east) on [address] and follow it to the Emergency entrance.  From the west via [address] 13 ([place], [place])  Take [address] east to [address]. Turn right (south) on [address] to [address]. Turn left (east) on [address] and follow it to the Emergency entrance.

## 2017-08-15 NOTE — IP AVS SNAPSHOT
MRN:[id number]                      After Visit Summary   8/15/2017    Matthias Cueto    MRN: [id number]           Thank you!     Thank you for choosing [facility] for your care. Our goal is always to provide you with excellent care. Hearing back from our patients is one way we can continue to improve our services. Please take a few minutes to complete the written survey that you may receive in the mail after you visit. If you would like to speak to someone directly about your visit please contact Patient Relations at [phone]. Thank you!          Patient Information     Date Of Birth          1955        About your hospital stay     You were admitted on:  August 15, 2017 You last received care in the:  [facility]    You were discharged on:  August 15, 2017       Who to Call     For medical emergencies, please call 911.  For non-urgent questions about your medical care, please call your primary care provider or clinic, [phone]  For questions related to your surgery, please call your surgery clinic        Attending Provider     Provider Specialty    Matthias Mason MD Gastroenterology       Primary Care Provider Office Phone # Fax #    Porfirio Sprague [phone]      Pending Results     No orders found from 8/13/2017 to 8/16/2017.            Admission Information     Date & Time Provider Department Dept. Phone    8/15/2017 Matthias Mason MD [facility] Endoscopy [phone]      Your Vitals Were     Blood Pressure Respirations Pulse Oximetry             126/90 8 92%         MyChart Information     [employer] gives you secure access to your electronic health record. If you see a primary care provider, you can also send messages to your care team and make appointments. If you have questions, please call your primary care clinic.  If you do not have a primary care provider, please call [phone] and they will assist you.         Care EveryWhere ID     This is your Care EveryWhere ID. This could be used by other organizations to access your [place] medical records  [id number]        Equal Access to Services     CELINE ROJO : Amber De Paz, chelle bradford, sherioscar ramirezdre hensley, astrid rodolfoin hayaajulianna domingoyazan carcamo jerrell mendiola. So [facility] [phone].    ATENCIÓN: Si habla español, tiene a de los santos disposición servicios gratuitos de asistencia lingüística. Llame al [phone].    We comply with applicable federal civil rights laws and [place] laws. We do not discriminate on the basis of race, color, national origin, age, disability sex, sexual orientation or gender identity.               Review of your medicines      CONTINUE these medicines which have NOT CHANGED        Dose / Directions    aspirin 81 MG tablet   Used for:  Mixed hyperlipidemia        1 TABLET DAILY   Refills:  0       enoxaparin 100 MG/ML injection   Commonly known as:  LOVENOX   Used for:  Heart valve replaced        Dose:  100 mg   Inject 1 mL (100 mg) Subcutaneous every 12 hours   Quantity:  12 Syringe   Refills:  0       fa-pyridoxine-cyancobalamin 2.5-25-2 MG Tabs per tablet   Commonly known as:  FOLBIC   Used for:  Nutrition disorder        Dose:  1 tablet   Take 1 tablet by mouth daily   Quantity:  90 each   Refills:  4       FISH OIL        1 tab tid   Refills:  0       gabapentin 600 MG tablet   Commonly known as:  NEURONTIN   Used for:  Tremor        Dose:  600 mg   Take 1 tablet (600 mg) by mouth 4 times daily   Quantity:  360 tablet   Refills:  4       GLUCOSAMINE CHONDR COMPLEX PO        Take  by mouth.   Refills:  0       MULTI vitamin  MENS Tabs        1 TABLET DAILY   Refills:  0       simvastatin 40 MG tablet   Commonly known as:  ZOCOR   Used for:  Hyperlipidemia LDL goal <100        Dose:  40 mg   Take 1 tablet (40 mg) by mouth At Bedtime at bedtime.   Quantity:  90 tablet   Refills:  3       warfarin 7.5 MG tablet   Commonly known as:   COUMADIN   Used for:  Mitral valve disorder        Take 1 tablet (7.5 mg) daily except take 1/2 tablet (3.75 mg) on Tues and  Sat, or as directed   Quantity:  90 tablet   Refills:  1                Protect others around you: Learn how to safely use, store and throw away your medicines at www.disposemymeds.org.             Medication List: This is a list of all your medications and when to take them. Check marks below indicate your daily home schedule. Keep this list as a reference.      Medications           Morning Afternoon Evening Bedtime As Needed    aspirin 81 MG tablet   1 TABLET DAILY                                enoxaparin 100 MG/ML injection   Commonly known as:  LOVENOX   Inject 1 mL (100 mg) Subcutaneous every 12 hours                                fa-pyridoxine-cyancobalamin 2.5-25-2 MG Tabs per tablet   Commonly known as:  FOLBIC   Take 1 tablet by mouth daily                                FISH OIL   1 tab tid                                gabapentin 600 MG tablet   Commonly known as:  NEURONTIN   Take 1 tablet (600 mg) by mouth 4 times daily                                GLUCOSAMINE CHONDR COMPLEX PO   Take  by mouth.                                MULTI vitamin  MENS Tabs   1 TABLET DAILY                                simvastatin 40 MG tablet   Commonly known as:  ZOCOR   Take 1 tablet (40 mg) by mouth At Bedtime at bedtime.                                warfarin 7.5 MG tablet   Commonly known as:  COUMADIN   Take 1 tablet (7.5 mg) daily except take 1/2 tablet (3.75 mg) on Tues and  Sat, or as directed                                          More Information        Esophagitis     With esophagitis, the lining of the esophagus is inflamed.   Do you often have burning pain in your chest? You may have esophagitis. This is when the lining of the esophagus becomes red and swollen (inflamed). The esophagus is the tube that connects your throat to your stomach. This sheet tells you more about  esophagitis. It also explains your treatment options.  Main types of esophagitis  Reflux esophagitis. This is the more common type. It is caused by GERD (gastroesophageal reflux disease). Stomach contents with stomach acid flow back up into the esophagus. This happens over and over. It leads to inflammation. Risk factors can include:    Being overweight    Asthma    Smoking    Pregnancy    Frequent vomiting    Certain medicines (such as aspirin and other anti-inflammatories)    Hiatal hernia  Infectious esophagitis. This is caused by an infection. You are more at risk for this if you have a weakened immune system and poor nutrition. Antibiotic use can also be a factor. The infection is often due to the following:    A type of fungus (typically candida)    A virus, such as herpes simplex virus 1 (HSV-1) or cytomegalovirus (CMV)  Eosinophilic esophagitis. Foods or other things around you can give you an allergic reaction. This triggers an immune response and leads to esophagitis.  Pill-induced esophagitis. Certain types of medicines can cause inflammation and ulcers in the esophagus. These include doxycycline, aspirin, NSAIDs, alendronate, potassium, quinidine, iron.  Symptoms of esophagitis  The following symptoms can occur with esophagitis:    Pain when swallowing, or trouble swallowing    Pain behind your breastbone (heartburn)    Acid regurgitation    Chronic sore throat    Gum Inflammation    Cavities    Bad breath    Nausea    Pain in your upper belly (abdomen)    Bleeding (indicated by bright red vomit or black, tarry stool)  These symptoms occur more often with reflux esophagitis:    Coughing, wheezing, or asthma    Hoarseness  Diagnosis of esophagitis  Your healthcare provider will ask about your health history and symptoms. You ll also be examined. Sometimes certain tests are needed. These may include:    Upper endoscopy. A thin, flexible tube with a tiny light and camera is used. It is inserted through the  mouth down into the esophagus. This lets the provider look for damage. A small sample of tissue (biopsy) may also be removed. The sample is sent to a lab for testing.    Upper GI X-ray with barium. An X-ray is done after you drink a substance called barium. Barium may make problems in the esophagus easier to see on an x-ray.    Esophageal pH. A soft, thin tube is passed into the esophagus through the nose or mouth for 24 hours. It measures the acid level in the esophagus.    Esophageal manometry. A soft, thin tube is passed into the esophagus through the nose or mouth. It measures muscle contractions in the esophagus.  Treatment of esophagitis  Medicines. Different medicines can help treat esophagitis. The medicine used will depend on the type of esophagitis you have. Talk with your healthcare provider.  Lifestyle changes. Making the following changes can help reduce irritation and ease your symptoms:    Avoid spicy foods (pepper, chili powder, rosado). Also avoid hard foods (nuts, crackers, raw vegetables) and acidic foods and drinks (tomatoes, citrus fruits and juices). Other problem foods include chocolate, peppermint, nutmeg, and foods high in fat.    Until you can swallow without pain, follow a combined liquid and soft diet. Try foods such as cooked cereals, mashed potatoes, and soups.    Take small bites and chew your food thoroughly.    Avoid large meals and heavy evening meals. Don't lie down within 2 to 3 hours of eating.    Get to or stay at a healthy weight.    Avoid alcohol, caffeine, and smoking or tobacco products.    Brush and floss your teeth    Raise your upper body by 4 to 6 inches when lying in bed. This can be done using a foam wedge. Or put blocks under the legs at the head of your bed.  Surgery. This may be needed for severe reflux esophagitis. Other noninvasive procedures to treat GERD and esophagitis are being studied. Your provider can tell you more.  Why treatment Is important  Without  treatment, esophagitis can get worse. This is especially true with severe reflux esophagitis. For instance, continued symptoms can cause scarring of the esophagus. Over time, this can cause a narrowing the esophagus (stricture). This can make it hard to pass food down to the stomach. As symptoms go on they can also cause changes in the lining of the esophagus. These changes can put you at a slightly higher risk of cancer of the esophagus.   Date Last Reviewed: 7/1/2016 2000-2017 [employer]. [address] [place] [address], [place] [address]. All rights reserved. This information is not intended as a substitute for professional medical care. Always follow your healthcare professional's instructions.

## 2017-08-29 ENCOUNTER — ANTICOAGULATION THERAPY VISIT (OUTPATIENT)
Dept: ANTICOAGULATION | Facility: CLINIC | Age: 62
End: 2017-08-29
Payer: COMMERCIAL

## 2017-08-29 DIAGNOSIS — Z95.2 HEART VALVE REPLACED: ICD-10-CM

## 2017-08-29 DIAGNOSIS — Z79.01 LONG-TERM (CURRENT) USE OF ANTICOAGULANTS: ICD-10-CM

## 2017-08-29 LAB — INR POINT OF CARE: 2.6 (ref 0.86–1.14)

## 2017-08-29 PROCEDURE — 36416 COLLJ CAPILLARY BLOOD SPEC: CPT

## 2017-08-29 PROCEDURE — 85610 PROTHROMBIN TIME: CPT | Mod: QW

## 2017-08-29 PROCEDURE — 99207 ZZC NO CHARGE NURSE ONLY: CPT

## 2017-08-29 RX ORDER — OMEPRAZOLE 40 MG/1
1 CAPSULE, DELAYED RELEASE ORAL DAILY
COMMUNITY

## 2017-08-29 NOTE — MR AVS SNAPSHOT
Matthias Cueto   8/29/2017 8:15 AM   Anticoagulation Therapy Visit    Description:  62 year old male   Provider:  [facility]   Department:  [facility]           INR as of 8/29/2017     Today's INR 2.6      Anticoagulation Summary as of 8/29/2017     INR goal 2.5-3.5   Today's INR 2.6   Full instructions 3.75 mg on Tue, Sat; 7.5 mg all other days   Next INR check 10/10/2017    Indications   Long-term (current) use of anticoagulants [Z79.01] [Z79.01]  Heart valve replaced [Z95.2] [Z95.2]         Your next Anticoagulation Clinic appointment(s)     Oct 10, 2017  8:00 AM CDT   [facility]    [address] [facility] [address]  [facility] [address] [phone]              Contact Numbers     [facility] Phone Numbers:  Anticoagulation Clinic Appointments : [phone]  Anticoagulation Nurse: [phone]         August 2017 Details    Sun Mon Tue Wed Thu Fri Sat       1               2               3               4               5                 6               7               8               9               10               11               12                 13               14               15               16               17               18               19                 20               21               22               23               24               25               26                 27               28               29      3.75 mg   See details      30      7.5 mg         31      7.5 mg            Date Details   08/29 This INR check               How to take your warfarin dose     To take:  3.75 mg Take 0.5 of a 7.5 mg tablet.    To take:  7.5 mg Take 1 of the 7.5 mg tablets.           September 2017 Details    Sun Mon Tue Wed Thu Fri Sat          1      7.5 mg         2      3.75 mg           3      7.5 mg         4      7.5 mg         5      3.75 mg         6      7.5  mg         7      7.5 mg         8      7.5 mg         9      3.75 mg           10      7.5 mg         11      7.5 mg         12      3.75 mg         13      7.5 mg         14      7.5 mg         15      7.5 mg         16      3.75 mg           17      7.5 mg         18      7.5 mg         19      3.75 mg         20      7.5 mg         21      7.5 mg         22      7.5 mg         23      3.75 mg           24      7.5 mg         25      7.5 mg         26      3.75 mg         27      7.5 mg         28      7.5 mg         29      7.5 mg         30      3.75 mg          Date Details   No additional details            How to take your warfarin dose     To take:  3.75 mg Take 0.5 of a 7.5 mg tablet.    To take:  7.5 mg Take 1 of the 7.5 mg tablets.           October 2017 Details    Sun Mon Tue Wed Thu Fri Sat     1      7.5 mg         2      7.5 mg         3      3.75 mg         4      7.5 mg         5      7.5 mg         6      7.5 mg         7      3.75 mg           8      7.5 mg         9      7.5 mg         10            11               12               13               14                 15               16               17               18               19               20               21                 22               23               24               25               26               27               28                 29               30               31                    Date Details   No additional details    Date of next INR:  10/10/2017         How to take your warfarin dose     To take:  3.75 mg Take 0.5 of a 7.5 mg tablet.    To take:  7.5 mg Take 1 of the 7.5 mg tablets.

## 2017-10-10 ENCOUNTER — ANTICOAGULATION THERAPY VISIT (OUTPATIENT)
Dept: ANTICOAGULATION | Facility: CLINIC | Age: 62
End: 2017-10-10
Payer: COMMERCIAL

## 2017-10-10 ENCOUNTER — TELEPHONE (OUTPATIENT)
Dept: ANTICOAGULATION | Facility: CLINIC | Age: 62
End: 2017-10-10

## 2017-10-10 DIAGNOSIS — Z95.2 HEART VALVE REPLACED: ICD-10-CM

## 2017-10-10 DIAGNOSIS — Z95.2 HEART VALVE REPLACED: Primary | ICD-10-CM

## 2017-10-10 DIAGNOSIS — Z79.01 LONG-TERM (CURRENT) USE OF ANTICOAGULANTS: ICD-10-CM

## 2017-10-10 LAB — INR POINT OF CARE: 3.5 (ref 0.86–1.14)

## 2017-10-10 PROCEDURE — 85610 PROTHROMBIN TIME: CPT | Mod: QW

## 2017-10-10 PROCEDURE — 36416 COLLJ CAPILLARY BLOOD SPEC: CPT

## 2017-10-10 PROCEDURE — 99207 ZZC NO CHARGE NURSE ONLY: CPT

## 2017-10-10 NOTE — PROGRESS NOTES
ANTICOAGULATION FOLLOW-UP CLINIC VISIT    Patient Name:  Matthias Cueto  Date:  10/10/2017  Contact Type:  Face to Face    SUBJECTIVE:     Patient Findings     Positives No Problem Findings           OBJECTIVE    INR Protime   Date Value Ref Range Status   10/10/2017 3.5 (A) 0.86 - 1.14 Final       ASSESSMENT / PLAN  INR assessment THER    Recheck INR In: 6 WEEKS    INR Location Clinic      Anticoagulation Summary as of 10/10/2017     INR goal 2.5-3.5   Today's INR 3.5   Maintenance plan 3.75 mg (7.5 mg x 0.5) on Tue, Sat; 7.5 mg (7.5 mg x 1) all other days   Full instructions 3.75 mg on Tue, Sat; 7.5 mg all other days   Weekly total 45 mg   No change documented Lina Benito RN   Plan last modified Lina Benito RN (4/18/2016)   Next INR check 11/21/2017   Priority INR   Target end date     Indications   Long-term (current) use of anticoagulants [Z79.01] [Z79.01]  Heart valve replaced [Z95.2] [Z95.2]         Anticoagulation Episode Summary     INR check location     Preferred lab     Send INR reminders to [facility]    Comments       Anticoagulation Care Providers     Provider Role Specialty Phone number    Porfirio Sprague MD Responsible Internal Medicine [phone]            See the Encounter Report to view Anticoagulation Flowsheet and Dosing Calendar (Go to Encounters tab in chart review, and find the Anticoagulation Therapy Visit)    Dosage adjustment made based on physician directed care plan.    Lina Benito RN

## 2017-10-10 NOTE — TELEPHONE ENCOUNTER
For insurance purposes, an annual INR referral is required. Please sign the order and route back to the INR Clinic. Lina Benito RN

## 2017-10-10 NOTE — MR AVS SNAPSHOT
Matthias Cueto   10/10/2017 8:00 AM   Anticoagulation Therapy Visit    Description:  62 year old male   Provider:  [facility]   Department:  [facility]           INR as of 10/10/2017     Today's INR 3.5      Anticoagulation Summary as of 10/10/2017     INR goal 2.5-3.5   Today's INR 3.5   Full instructions 3.75 mg on Tue, Sat; 7.5 mg all other days   Next INR check 11/21/2017    Indications   Long-term (current) use of anticoagulants [Z79.01] [Z79.01]  Heart valve replaced [Z95.2] [Z95.2]         Your next Anticoagulation Clinic appointment(s)     Nov 21, 2017  8:00 AM CST   Anticoagulation Visit with [facility]   [facility] ([facility])    [address] [facility] [address]  [facility] [address] [phone]              Contact Numbers     [facility] Phone Numbers:  Anticoagulation Clinic Appointments : [phone]  Anticoagulation Nurse: [phone]         October 2017 Details    Sun Mon Tue Wed Thu Fri Sat     1               2               3               4               5               6               7                 8               9               10      3.75 mg   See details      11      7.5 mg         12      7.5 mg         13      7.5 mg         14      3.75 mg           15      7.5 mg         16      7.5 mg         17      3.75 mg         18      7.5 mg         19      7.5 mg         20      7.5 mg         21      3.75 mg           22      7.5 mg         23      7.5 mg         24      3.75 mg         25      7.5 mg         26      7.5 mg         27      7.5 mg         28      3.75 mg           29      7.5 mg         30      7.5 mg         31      3.75 mg              Date Details   10/10 This INR check               How to take your warfarin dose     To take:  3.75 mg Take 0.5 of a 7.5 mg tablet.    To take:  7.5 mg Take 1 of the 7.5 mg tablets.           November 2017 Details    Sun Mon Tue Wed Thu Fri Sat        1       7.5 mg         2      7.5 mg         3      7.5 mg         4      3.75 mg           5      7.5 mg         6      7.5 mg         7      3.75 mg         8      7.5 mg         9      7.5 mg         10      7.5 mg         11      3.75 mg           12      7.5 mg         13      7.5 mg         14      3.75 mg         15      7.5 mg         16      7.5 mg         17      7.5 mg         18      3.75 mg           19      7.5 mg         20      7.5 mg         21            22               23               24               25                 26               27               28               29               30                  Date Details   No additional details    Date of next INR:  11/21/2017         How to take your warfarin dose     To take:  3.75 mg Take 0.5 of a 7.5 mg tablet.    To take:  7.5 mg Take 1 of the 7.5 mg tablets.

## 2017-11-21 ENCOUNTER — ANTICOAGULATION THERAPY VISIT (OUTPATIENT)
Dept: ANTICOAGULATION | Facility: CLINIC | Age: 62
End: 2017-11-21
Payer: COMMERCIAL

## 2017-11-21 DIAGNOSIS — Z95.2 HEART VALVE REPLACED: ICD-10-CM

## 2017-11-21 DIAGNOSIS — Z79.01 LONG-TERM (CURRENT) USE OF ANTICOAGULANTS: ICD-10-CM

## 2017-11-21 LAB — INR POINT OF CARE: 3.4 (ref 0.86–1.14)

## 2017-11-21 PROCEDURE — 99207 ZZC NO CHARGE NURSE ONLY: CPT

## 2017-11-21 PROCEDURE — 36416 COLLJ CAPILLARY BLOOD SPEC: CPT

## 2017-11-21 PROCEDURE — 85610 PROTHROMBIN TIME: CPT | Mod: QW

## 2017-11-21 NOTE — PROGRESS NOTES
ANTICOAGULATION FOLLOW-UP CLINIC VISIT    Patient Name:  Matthias Cueto  Date:  11/21/2017  Contact Type:  Face to Face    SUBJECTIVE:     Patient Findings     Positives No Problem Findings           OBJECTIVE    INR Protime   Date Value Ref Range Status   11/21/2017 3.4 (A) 0.86 - 1.14 Final       ASSESSMENT / PLAN  INR assessment THER    Recheck INR In: 6 WEEKS    INR Location Clinic      Anticoagulation Summary as of 11/21/2017     INR goal 2.5-3.5   Today's INR 3.4   Maintenance plan 3.75 mg (7.5 mg x 0.5) on Tue, Sat; 7.5 mg (7.5 mg x 1) all other days   Full instructions 3.75 mg on Tue, Sat; 7.5 mg all other days   Weekly total 45 mg   No change documented Lina Benito RN   Plan last modified Lina Benito RN (4/18/2016)   Next INR check 1/2/2018   Priority INR   Target end date     Indications   Long-term (current) use of anticoagulants [Z79.01] [Z79.01]  Heart valve replaced [Z95.2] [Z95.2]         Anticoagulation Episode Summary     INR check location     Preferred lab     Send INR reminders to [facility]    Comments       Anticoagulation Care Providers     Provider Role Specialty Phone number    Porfirio Sprague MD Responsible Internal Medicine [phone]            See the Encounter Report to view Anticoagulation Flowsheet and Dosing Calendar (Go to Encounters tab in chart review, and find the Anticoagulation Therapy Visit)    Dosage adjustment made based on physician directed care plan.    Lina Benito RN

## 2017-11-21 NOTE — MR AVS SNAPSHOT
Matthias Cueto   11/21/2017 8:00 AM   Anticoagulation Therapy Visit    Description:  62 year old male   Provider:  [facility]   Department:  [facility]           INR as of 11/21/2017     Today's INR 3.4      Anticoagulation Summary as of 11/21/2017     INR goal 2.5-3.5   Today's INR 3.4   Full instructions 3.75 mg on Tue, Sat; 7.5 mg all other days   Next INR check 1/2/2018    Indications   Long-term (current) use of anticoagulants [Z79.01] [Z79.01]  Heart valve replaced [Z95.2] [Z95.2]         Your next Anticoagulation Clinic appointment(s)     Jan 02, 2018  8:00 AM CST   Anticoagulation Visit with [facility]   [facility] ([facility])    [address] [facility] [address]  [facility] [address] [phone]              Contact Numbers     [facility] Phone Numbers:  Anticoagulation Clinic Appointments : [phone]  Anticoagulation Nurse: [phone]         November 2017 Details    Sun Mon Tue Wed Thu Fri Sat        1               2               3               4                 5               6               7               8               9               10               11                 12               13               14               15               16               17               18                 19               20               21      3.75 mg   See details      22      7.5 mg         23      7.5 mg         24      7.5 mg         25      3.75 mg           26      7.5 mg         27      7.5 mg         28      3.75 mg         29      7.5 mg         30      7.5 mg            Date Details   11/21 This INR check               How to take your warfarin dose     To take:  3.75 mg Take 0.5 of a 7.5 mg tablet.    To take:  7.5 mg Take 1 of the 7.5 mg tablets.           December 2017 Details    Sun Mon Tue Wed Thu Fri Sat          1      7.5 mg         2      3.75 mg           3      7.5 mg         4      7.5 mg         5       3.75 mg         6      7.5 mg         7      7.5 mg         8      7.5 mg         9      3.75 mg           10      7.5 mg         11      7.5 mg         12      3.75 mg         13      7.5 mg         14      7.5 mg         15      7.5 mg         16      3.75 mg           17      7.5 mg         18      7.5 mg         19      3.75 mg         20      7.5 mg         21      7.5 mg         22      7.5 mg         23      3.75 mg           24      7.5 mg         25      7.5 mg         26      3.75 mg         27      7.5 mg         28      7.5 mg         29      7.5 mg         30      3.75 mg           31      7.5 mg                Date Details   No additional details            How to take your warfarin dose     To take:  3.75 mg Take 0.5 of a 7.5 mg tablet.    To take:  7.5 mg Take 1 of the 7.5 mg tablets.           January 2018 Details    Sun Mon Tue Wed Thu Fri Sat      1      7.5 mg         2            3               4               5               6                 7               8               9               10               11               12               13                 14               15               16               17               18               19               20                 21               22               23               24               25               26               27                 28               29               30               31                   Date Details   No additional details    Date of next INR:  1/2/2018         How to take your warfarin dose     To take:  3.75 mg Take 0.5 of a 7.5 mg tablet.    To take:  7.5 mg Take 1 of the 7.5 mg tablets.

## 2017-12-07 DIAGNOSIS — Z95.2 HEART VALVE REPLACED: Primary | ICD-10-CM

## 2017-12-07 RX ORDER — WARFARIN SODIUM 7.5 MG/1
TABLET ORAL
Qty: 78 TABLET | Refills: 1 | Status: SHIPPED | OUTPATIENT
Start: 2017-12-07

## 2017-12-07 NOTE — TELEPHONE ENCOUNTER
Warfarin 7.5 mg    Last Written Prescription Date: 5/16/17  Last Fill Qty: 90, # refills: 1  Last Office Visit with [facility], [facility] or [facility] prescribing provider: 7/25/17       Date and Result of Last PT/INR:   Lab Results   Component Value Date    INR 3.4 11/21/2017    INR 3.5 10/10/2017    INR 0.97 08/15/2017    INR 2.50 01/18/2016              Lab Results   Component Value Date    INR 3.4 (A) 11/21/2017    INR 3.5 (A) 10/10/2017    INR 2.6 (A) 08/29/2017    INR 0.97 08/15/2017    INR 3.6 (A) 06/28/2017

## 2017-12-07 NOTE — TELEPHONE ENCOUNTER
Prescription approved per [facility] Refill Protocol.  Ashleigh Lopez RN

## 2018-01-09 ENCOUNTER — ANTICOAGULATION THERAPY VISIT (OUTPATIENT)
Dept: ANTICOAGULATION | Facility: CLINIC | Age: 63
End: 2018-01-09
Payer: COMMERCIAL

## 2018-01-09 DIAGNOSIS — Z95.2 HEART VALVE REPLACED: ICD-10-CM

## 2018-01-09 DIAGNOSIS — Z79.01 LONG-TERM (CURRENT) USE OF ANTICOAGULANTS: ICD-10-CM

## 2018-01-09 LAB — INR POINT OF CARE: 3.4 (ref 0.86–1.14)

## 2018-01-09 PROCEDURE — 99207 ZZC NO CHARGE NURSE ONLY: CPT

## 2018-01-09 PROCEDURE — 85610 PROTHROMBIN TIME: CPT | Mod: QW

## 2018-01-09 PROCEDURE — 36416 COLLJ CAPILLARY BLOOD SPEC: CPT

## 2018-01-09 NOTE — MR AVS SNAPSHOT
Matthias Cueto   1/9/2018 8:30 AM   Anticoagulation Therapy Visit    Description:  62 year old male   Provider[facility]  [facility]   Department:  [facility]           INR as of 1/9/2018     Today's INR 3.4      Anticoagulation Summary as of 1/9/2018     INR goal 2.5-3.5   Today's INR 3.4   Full instructions 3.75 mg on Tue, Sat; 7.5 mg all other days   Next INR check 2/20/2018    Indications   Long-term (current) use of anticoagulants [Z79.01] [Z79.01]  Heart valve replaced [Z95.2] [Z95.2]         Contact Numbers     [facility] Phone Numbers:  Anticoagulation Clinic Appointments : [phone]  Anticoagulation Nurse: [phone]         January 2018 Details    Sun Mon Tue Wed Thu Fri Sat      1               2               3               4               5               6                 7               8               9      3.75 mg   See details      10      7.5 mg         11      7.5 mg         12      7.5 mg         13      3.75 mg           14      7.5 mg         15      7.5 mg         16      3.75 mg         17      7.5 mg         18      7.5 mg         19      7.5 mg         20      3.75 mg           21      7.5 mg         22      7.5 mg         23      3.75 mg         24      7.5 mg         25      7.5 mg         26      7.5 mg         27      3.75 mg           28      7.5 mg         29      7.5 mg         30      3.75 mg         31      7.5 mg             Date Details   01/09 This INR check               How to take your warfarin dose     To take:  3.75 mg Take 0.5 of a 7.5 mg tablet.    To take:  7.5 mg Take 1 of the 7.5 mg tablets.           February 2018 Details    Sun Mon Tue Wed Thu Fri Sat         1      7.5 mg         2      7.5 mg         3      3.75 mg           4      7.5 mg         5      7.5 mg         6      3.75 mg         7      7.5 mg         8      7.5 mg         9      7.5 mg         10      3.75 mg           11      7.5 mg         12      7.5 mg         13       3.75 mg         14      7.5 mg         15      7.5 mg         16      7.5 mg         17      3.75 mg           18      7.5 mg         19      7.5 mg         20            21               22               23               24                 25               26               27               28                   Date Details   No additional details    Date of next INR:  2/20/2018         How to take your warfarin dose     To take:  3.75 mg Take 0.5 of a 7.5 mg tablet.    To take:  7.5 mg Take 1 of the 7.5 mg tablets.

## 2018-01-19 ENCOUNTER — TRANSFERRED RECORDS (OUTPATIENT)
Dept: HEALTH INFORMATION MANAGEMENT | Facility: CLINIC | Age: 63
End: 2018-01-19

## 2018-02-15 ENCOUNTER — TELEPHONE (OUTPATIENT)
Dept: INTERNAL MEDICINE | Facility: CLINIC | Age: 63
End: 2018-02-15

## 2018-02-15 NOTE — TELEPHONE ENCOUNTER
Matthias Cueto is a 62 year old male who calls with Edema of Right foot.    NURSING ASSESSMENT:  Description:  Top of right foot, but worsening, spreading and getting more swollen.   Onset/duration:  For several months.   Precip. factors:  Pt has mechanical valve since 2000, has not seen Cardiology for several years   Associated symptoms:  Denies any SOB, chest pain, no pain   Improves/worsens symptoms:  Elevating, better in AM.   Pain scale (0-10)   0/10    Last exam/Treatment:  7/25/17  Allergies: No Known Allergies    MEDICATIONS:   Taking medication(s) as prescribed? Yes  Taking over the counter medication(s?) Yes  Any medication side effects? Not Applicable    Any barriers to taking medication(s) as prescribed?  No  Medication(s) improving/managing symptoms?  N/A  Medication reconciliation completed: Yes      NURSING PLAN: Nursing advice to patient SCheduled OV and INR tomorrow     RECOMMENDED DISPOSITION:  See in 24 hours - OV and INR   Will comply with recommendation: Yes  If further questions/concerns or if symptoms do not improve, worsen or new symptoms develop, call your PCP or [place] [place] [facility] as soon as possible.      Guideline used:  Telephone Triage Protocols for Nurses, Fourth Edition, Cierra Newton RN

## 2018-02-16 ENCOUNTER — ANTICOAGULATION THERAPY VISIT (OUTPATIENT)
Dept: ANTICOAGULATION | Facility: CLINIC | Age: 63
End: 2018-02-16
Payer: COMMERCIAL

## 2018-02-16 ENCOUNTER — OFFICE VISIT (OUTPATIENT)
Dept: INTERNAL MEDICINE | Facility: CLINIC | Age: 63
End: 2018-02-16
Payer: COMMERCIAL

## 2018-02-16 ENCOUNTER — HOSPITAL ENCOUNTER (OUTPATIENT)
Dept: ULTRASOUND IMAGING | Facility: CLINIC | Age: 63
Discharge: HOME | End: 2018-02-16
Attending: INTERNAL MEDICINE | Admitting: INTERNAL MEDICINE
Payer: COMMERCIAL

## 2018-02-16 VITALS
HEART RATE: 86 BPM | BODY MASS INDEX: 31.83 KG/M2 | TEMPERATURE: 98.8 F | HEIGHT: 74 IN | DIASTOLIC BLOOD PRESSURE: 70 MMHG | SYSTOLIC BLOOD PRESSURE: 112 MMHG | WEIGHT: 248 LBS | OXYGEN SATURATION: 96 %

## 2018-02-16 DIAGNOSIS — Z79.01 LONG TERM CURRENT USE OF ANTICOAGULANT THERAPY: ICD-10-CM

## 2018-02-16 DIAGNOSIS — R06.02 SOB (SHORTNESS OF BREATH): ICD-10-CM

## 2018-02-16 DIAGNOSIS — Z95.2 HEART VALVE REPLACED: ICD-10-CM

## 2018-02-16 DIAGNOSIS — Z79.01 LONG-TERM (CURRENT) USE OF ANTICOAGULANTS: ICD-10-CM

## 2018-02-16 DIAGNOSIS — M79.89 RIGHT LEG SWELLING: Primary | ICD-10-CM

## 2018-02-16 DIAGNOSIS — M79.89 RIGHT LEG SWELLING: ICD-10-CM

## 2018-02-16 DIAGNOSIS — E78.5 HYPERLIPIDEMIA LDL GOAL <130: ICD-10-CM

## 2018-02-16 LAB
ERYTHROCYTE [DISTWIDTH] IN BLOOD BY AUTOMATED COUNT: 14.8 % (ref 10–15)
ERYTHROCYTE [SEDIMENTATION RATE] IN BLOOD BY WESTERGREN METHOD: 6 MM/H (ref 0–20)
HCT VFR BLD AUTO: 43.8 % (ref 40–53)
HGB BLD-MCNC: 14.1 G/DL (ref 13.3–17.7)
INR POINT OF CARE: 3.7 (ref 0.86–1.14)
MCH RBC QN AUTO: 30.5 PG (ref 26.5–33)
MCHC RBC AUTO-ENTMCNC: 32.2 G/DL (ref 31.5–36.5)
MCV RBC AUTO: 95 FL (ref 78–100)
NT-PROBNP SERPL-MCNC: 99 PG/ML (ref 0–125)
PLATELET # BLD AUTO: 198 10E9/L (ref 150–450)
RBC # BLD AUTO: 4.62 10E12/L (ref 4.4–5.9)
WBC # BLD AUTO: 5.5 10E9/L (ref 4–11)

## 2018-02-16 PROCEDURE — 99207 ZZC NO CHARGE NURSE ONLY: CPT

## 2018-02-16 PROCEDURE — 93971 EXTREMITY STUDY: CPT | Mod: RT

## 2018-02-16 PROCEDURE — 84443 ASSAY THYROID STIM HORMONE: CPT | Performed by: INTERNAL MEDICINE

## 2018-02-16 PROCEDURE — 99214 OFFICE O/P EST MOD 30 MIN: CPT | Performed by: INTERNAL MEDICINE

## 2018-02-16 PROCEDURE — 36416 COLLJ CAPILLARY BLOOD SPEC: CPT

## 2018-02-16 PROCEDURE — 82306 VITAMIN D 25 HYDROXY: CPT | Performed by: INTERNAL MEDICINE

## 2018-02-16 PROCEDURE — 85652 RBC SED RATE AUTOMATED: CPT | Performed by: INTERNAL MEDICINE

## 2018-02-16 PROCEDURE — 83880 ASSAY OF NATRIURETIC PEPTIDE: CPT | Performed by: INTERNAL MEDICINE

## 2018-02-16 PROCEDURE — 85610 PROTHROMBIN TIME: CPT | Mod: QW

## 2018-02-16 PROCEDURE — 85027 COMPLETE CBC AUTOMATED: CPT | Performed by: INTERNAL MEDICINE

## 2018-02-16 PROCEDURE — 80053 COMPREHEN METABOLIC PANEL: CPT | Performed by: INTERNAL MEDICINE

## 2018-02-16 PROCEDURE — 84550 ASSAY OF BLOOD/URIC ACID: CPT | Performed by: INTERNAL MEDICINE

## 2018-02-16 NOTE — NURSING NOTE
"Chief Complaint   Patient presents with     Swelling     Rt foot swelling       Initial /70  Pulse 86  Temp 98.8  F (37.1  C) (Oral)  Ht 6' 2.25\" (1.886 m)  Wt 248 lb (112.5 kg)  SpO2 96%  BMI 31.63 kg/m2 Estimated body mass index is 31.63 kg/(m^2) as calculated from the following:    Height as of this encounter: 6' 2.25\" (1.886 m).    Weight as of this encounter: 248 lb (112.5 kg).  Medication Reconciliation: complete   Obdulia Kamara MA      "

## 2018-02-16 NOTE — PROGRESS NOTES
SUBJECTIVE:   Matthias Cueto is a 62 year old male who presents to clinic today for the following health issues:      Rt foot swelling:  Presents with swelling of the right foot and ankle. For 3 days. Feels mild pain/ pressure. Appear erythematous. No injury, no recent travel. On anticoagulation for h/o heart valve replacement.   No fever , chills. No prior h/o edema, joint problems   Has had SOB on exertion. Chronic. No chest pain, no palpitations. Pt is overweight.       PROBLEMS TO ADD ON...  Has H/O hyperlipidemia. On medical treatment and diet. No side effects. No muscle weakness, myalgias or upset stomach.     Problem list and histories reviewed & adjusted, as indicated.  Additional history: as documented    Patient Active Problem List   Diagnosis     Mitral valve disorder     Long term current use of anticoagulant therapy     HYPERLIPIDEMIA LDL GOAL <130     Advanced directives, counseling/discussion     Intention tremor     Long-term (current) use of anticoagulants [Z79.01]     Heart valve replaced [Z95.2]     ANALILIA on CPAP     Past Surgical History:   Procedure Laterality Date     C CORTISONE INJECTION  5/2015    in back     C NONSPECIFIC PROCEDURE       C NONSPECIFIC PROCEDURE      S/P Vasectomy     C NONSPECIFIC PROCEDURE  05/00     CARDIAC SURGERY       ESOPHAGOSCOPY, GASTROSCOPY, DUODENOSCOPY (EGD), COMBINED N/A 8/15/2017    Procedure: COMBINED ESOPHAGOSCOPY, GASTROSCOPY, DUODENOSCOPY (EGD);  ESOPHAGOSCOPY, GASTROSCOPY, DUODENOSCOPY EGD  ;  Surgeon: Matthias Mason MD;  Location:  GI     Left and Right Foot toe surgery       MV replacement with [facility]  5/2000     VASECTOMY         Social History   Substance Use Topics     Smoking status: Never Smoker     Smokeless tobacco: Never Used     Alcohol use No     Family History   Problem Relation Age of Onset     Cardiovascular Father      MVP     Blood Disease Mother      CLL age 79         Current Outpatient Prescriptions   Medication Sig  "Dispense Refill     JANTOVEN 7.5 MG tablet TAKE 1 TABLET DAILY EXCEPT TAKE 1/2 TABLET (3.75MG) ONTUESDAY AND SATURDAY, OR ASDIRECTED 78 tablet 1     omeprazole (PRILOSEC) 40 MG capsule Take 1 capsule by mouth daily       enoxaparin (LOVENOX) 100 MG/ML injection Inject 1 mL (100 mg) Subcutaneous every 12 hours 12 Syringe 0     simvastatin (ZOCOR) 40 MG tablet Take 1 tablet (40 mg) by mouth At Bedtime at bedtime. 90 tablet 3     gabapentin (NEURONTIN) 600 MG tablet Take 1 tablet (600 mg) by mouth 4 times daily 360 tablet 4     fa-pyridoxine-cyancobalamin (FOLBIC) 2.5-25-2 MG TABS per tablet Take 1 tablet by mouth daily 90 each 4     Glucosamine-Chondroitin (GLUCOSAMINE CHONDR COMPLEX PO) Take  by mouth.       MULTI VITAMIN MENS OR TABS 1 TABLET DAILY       FISH OIL 1 tab tid       ASPIRIN 81 MG OR TABS 1 TABLET DAILY         Reviewed and updated as needed this visit by clinical staff  Tobacco       Reviewed and updated as needed this visit by Provider         ROS:  Constitutional, HEENT, cardiovascular, pulmonary, gi and gu systems are negative, except as otherwise noted.    OBJECTIVE:     /70  Pulse 86  Temp 98.8  F (37.1  C) (Oral)  Ht 6' 2.25\" (1.886 m)  Wt 248 lb (112.5 kg)  SpO2 96%  BMI 31.63 kg/m2  Body mass index is 31.63 kg/(m^2).   GENERAL: overweight, alert and no distress  NECK: no adenopathy, no asymmetry, masses, or scars and thyroid normal to palpation  RESP: lungs clear to auscultation - no rales, rhonchi or wheezes  CV: regular rate and rhythm, normal S1 S2, no S3 or S4, no murmur, + systolic click ,no rub  ABDOMEN: soft, nontender, no hepatosplenomegaly, no masses and bowel sounds normal  MS: no gross musculoskeletal defects noted, right foot dorsal surface erythema and  Edema tenderness with flexion and extension , mild     Diagnostic Test Results:  Results for orders placed or performed in visit on 02/16/18   CBC with platelets   Result Value Ref Range    WBC 5.5 4.0 - 11.0 10e9/L    " RBC Count 4.62 4.4 - 5.9 10e12/L    Hemoglobin 14.1 13.3 - 17.7 g/dL    Hematocrit 43.8 40.0 - 53.0 %    MCV 95 78 - 100 fl    MCH 30.5 26.5 - 33.0 pg    MCHC 32.2 31.5 - 36.5 g/dL    RDW 14.8 10.0 - 15.0 %    Platelet Count 198 150 - 450 10e9/L   Erythrocyte sedimentation rate auto   Result Value Ref Range    Sed Rate 6 0 - 20 mm/h   Comprehensive metabolic panel   Result Value Ref Range    Sodium 141 133 - 144 mmol/L    Potassium 4.4 3.4 - 5.3 mmol/L    Chloride 107 94 - 109 mmol/L    Carbon Dioxide 29 20 - 32 mmol/L    Anion Gap 5 3 - 14 mmol/L    Glucose 97 70 - 99 mg/dL    Urea Nitrogen 18 7 - 30 mg/dL    Creatinine 0.97 0.66 - 1.25 mg/dL    GFR Estimate 77 >60 mL/min/1.7m2    GFR Estimate If Black >90 >60 mL/min/1.7m2    Calcium 9.2 8.5 - 10.1 mg/dL    Bilirubin Total 0.5 0.2 - 1.3 mg/dL    Albumin 4.0 3.4 - 5.0 g/dL    Protein Total 7.2 6.8 - 8.8 g/dL    Alkaline Phosphatase 79 40 - 150 U/L    ALT 28 0 - 70 U/L    AST 32 0 - 45 U/L   TSH with free T4 reflex   Result Value Ref Range    TSH 3.07 0.40 - 4.00 mU/L   BNP-N terminal pro   Result Value Ref Range    N-Terminal Pro Bnp 99 0 - 125 pg/mL   Uric acid   Result Value Ref Range    Uric Acid 7.3 (H) 3.5 - 7.2 mg/dL   Vitamin D Deficiency   Result Value Ref Range    Vitamin D Deficiency screening 28 20 - 75 ug/L       ASSESSMENT/PLAN:     Problem List Items Addressed This Visit     Long term current use of anticoagulant therapy    HYPERLIPIDEMIA LDL GOAL <130    Relevant Orders    Comprehensive metabolic panel (Completed)    Heart valve replaced [Z95.2]      Other Visit Diagnoses     Right leg swelling    -  Primary    Relevant Orders    US Lower Extremity Venous Duplex Right (Completed)    Erythrocyte sedimentation rate auto (Completed)    TSH with free T4 reflex (Completed)    BNP-N terminal pro (Completed)    Uric acid (Completed)    Vitamin D Deficiency (Completed)    SOB (shortness of breath)        Relevant Orders    CBC with platelets (Completed)     TSH with free T4 reflex (Completed)    BNP-N terminal pro (Completed)           Assess for DVT- negative   Assess for infection , inflammation and gout  Clinical possible gout, use Tylenol, consider Colchicine, Prednisone . Not able to take NSAID with AC  Assess for CHF     Follow-Up:with results     Porfirio Sprague MD  [facility]

## 2018-02-16 NOTE — MR AVS SNAPSHOT
"              After Visit Summary   2/16/2018    Matthias Cueto    MRN: [id number]           Patient Information     Date Of Birth          1955        Visit Information        Provider Department      2/16/2018 9:00 AM Porfirio Sprague MD [facility]        Today's Diagnoses     Right leg swelling    -  1    Hyperlipidemia LDL goal <130        SOB (shortness of breath)        Long term current use of anticoagulant therapy        Heart valve replaced [Z95.2]           Follow-ups after your visit        Who to contact     If you have questions or need follow up information about today's clinic visit or your schedule please contact [facility] directly at [phone].  Normal or non-critical lab and imaging results will be communicated to you by MyChart, letter or phone within 4 business days after the clinic has received the results. If you do not hear from us within 7 days, please contact the clinic through [employer] or phone. If you have a critical or abnormal lab result, we will notify you by phone as soon as possible.  Submit refill requests through [employer] or call your pharmacy and they will forward the refill request to us. Please allow 3 business days for your refill to be completed.          Additional Information About Your Visit        MyChart Information     [employer] gives you secure access to your electronic health record. If you see a primary care provider, you can also send messages to your care team and make appointments. If you have questions, please call your primary care clinic.  If you do not have a primary care provider, please call [phone] and they will assist you.        Care EveryWhere ID     This is your Care EveryWhere ID. This could be used by other organizations to access your [place] medical records  [id number]        Your Vitals Were     Pulse Temperature Height Pulse Oximetry BMI (Body Mass Index)       86 98.8  F (37.1  C) (Oral) 6' 2.25\" " (1.886 m) 96% 31.63 kg/m2        Blood Pressure from Last 3 Encounters:   02/16/18 112/70   08/15/17 113/59   07/25/17 118/78    Weight from Last 3 Encounters:   02/16/18 248 lb (112.5 kg)   07/25/17 243 lb (110.2 kg)   06/06/16 238 lb (108 kg)              We Performed the Following     BNP-N terminal pro     CBC with platelets     Comprehensive metabolic panel     Erythrocyte sedimentation rate auto     TSH with free T4 reflex     Uric acid     Vitamin D Deficiency        Primary Care Provider Office Phone # Fax #    Porfirio Sprague [phone]       [address] [facility] [address]        Equal Access to Services     CELINE ROJO : Hadii candido mcintyreo Baldev, waaxda lupeter, qaybta kaalmada shellie, astrid mendiola. So [facility] [phone].    ATENCIÓN: Si habla español, tiene a de los santos disposición servicios gratuitos de asistencia lingüística. Llame al [phone].    We comply with applicable federal civil rights laws and [place] laws. We do not discriminate on the basis of race, color, national origin, age, disability, sex, sexual orientation, or gender identity.            Thank you!     Thank you for choosing [facility]  for your care. Our goal is always to provide you with excellent care. Hearing back from our patients is one way we can continue to improve our services. Please take a few minutes to complete the written survey that you may receive in the mail after your visit with us. Thank you!             Your Updated Medication List - Protect others around you: Learn how to safely use, store and throw away your medicines at www.disposemymeds.org.          This list is accurate as of 2/16/18 11:59 PM.  Always use your most recent med list.                   Brand Name Dispense Instructions for use Diagnosis    aspirin 81 MG tablet      1 TABLET DAILY    Mixed hyperlipidemia       enoxaparin 100 MG/ML injection    LOVENOX    12 Syringe     Inject 1 mL (100 mg) Subcutaneous every 12 hours    Heart valve replaced       fa-pyridoxine-cyancobalamin 2.5-25-2 MG Tabs per tablet    FOLBIC    90 each    Take 1 tablet by mouth daily    Nutrition disorder       FISH OIL      1 tab tid        gabapentin 600 MG tablet    NEURONTIN    360 tablet    Take 1 tablet (600 mg) by mouth 4 times daily    Tremor       GLUCOSAMINE CHONDR COMPLEX PO      Take  by mouth.        JANTOVEN 7.5 MG tablet   Generic drug:  warfarin     78 tablet    TAKE 1 TABLET DAILY EXCEPT TAKE 1/2 TABLET (3.75MG) ONTUESDAY AND SATURDAY, OR ASDIRECTED    Heart valve replaced       MULTI vitamin  MENS Tabs      1 TABLET DAILY        omeprazole 40 MG capsule    priLOSEC     Take 1 capsule by mouth daily        simvastatin 40 MG tablet    ZOCOR    90 tablet    Take 1 tablet (40 mg) by mouth At Bedtime at bedtime.    Hyperlipidemia LDL goal <100

## 2018-02-16 NOTE — PROGRESS NOTES
ANTICOAGULATION FOLLOW-UP CLINIC VISIT    Patient Name:  Matthias Cueto  Date:  2/16/2018  Contact Type:  Face to Face    SUBJECTIVE:     Patient Findings     Positives Change in diet/appetite (Pt has tried adding in a salad once a week.  May try to increase these in his diet), Other complaints (foot swelling.  Seeing PCP regarding this.)           OBJECTIVE    INR Protime   Date Value Ref Range Status   02/16/2018 3.7 (A) 0.86 - 1.14 Final       ASSESSMENT / PLAN  INR assessment SUPRA    Recheck INR In: 4 WEEKS    INR Location Clinic      Anticoagulation Summary as of 2/16/2018     INR goal 2.5-3.5   Today's INR 3.7!   Maintenance plan 3.75 mg (7.5 mg x 0.5) on Tue, Sat; 7.5 mg (7.5 mg x 1) all other days   Full instructions 2/16: 3.75 mg; Otherwise 3.75 mg on Tue, Sat; 7.5 mg all other days   Weekly total 45 mg   Plan last modified Lina Benito RN (4/18/2016)   Next INR check 3/16/2018   Priority INR   Target end date     Indications   Long-term (current) use of anticoagulants [Z79.01] [Z79.01]  Heart valve replaced [Z95.2] [Z95.2]         Anticoagulation Episode Summary     INR check location     Preferred lab     Send INR reminders to [facility]    Comments       Anticoagulation Care Providers     Provider Role Specialty Phone number    Porfirio Sprague MD [facility] Internal Medicine [phone]            See the Encounter Report to view Anticoagulation Flowsheet and Dosing Calendar (Go to Encounters tab in chart review, and find the Anticoagulation Therapy Visit)    Dosage adjustment made based on physician directed care plan.    Ashleigh Lopez RN

## 2018-02-16 NOTE — MR AVS SNAPSHOT
Matthias Cueto   2/16/2018 8:30 AM   Anticoagulation Therapy Visit    Description:  62 year old male   Provider[facility]  [facility]   Department:  [facility]           INR as of 2/16/2018     Today's INR 3.7!      Anticoagulation Summary as of 2/16/2018     INR goal 2.5-3.5   Today's INR 3.7!   Full instructions 2/16: 3.75 mg; Otherwise 3.75 mg on Tue, Sat; 7.5 mg all other days   Next INR check 3/16/2018    Indications   Long-term (current) use of anticoagulants [Z79.01] [Z79.01]  Heart valve replaced [Z95.2] [Z95.2]         Contact Numbers     [facility] Phone Numbers:  Anticoagulation Clinic Appointments : [phone]  Anticoagulation Nurse: [phone]         February 2018 Details    Sun Mon Tue Wed Thu Fri Sat         1               2               3                 4               5               6               7               8               9               10                 11               12               13               14               15               16      3.75 mg   See details      17      3.75 mg           18      7.5 mg         19      7.5 mg         20      3.75 mg         21      7.5 mg         22      7.5 mg         23      7.5 mg         24      3.75 mg           25      7.5 mg         26      7.5 mg         27      3.75 mg         28      7.5 mg             Date Details   02/16 This INR check               How to take your warfarin dose     To take:  3.75 mg Take 0.5 of a 7.5 mg tablet.    To take:  7.5 mg Take 1 of the 7.5 mg tablets.           March 2018 Details    Sun Mon Tue Wed Thu Fri Sat         1      7.5 mg         2      7.5 mg         3      3.75 mg           4      7.5 mg         5      7.5 mg         6      3.75 mg         7      7.5 mg         8      7.5 mg         9      7.5 mg         10      3.75 mg           11      7.5 mg         12      7.5 mg         13      3.75 mg         14      7.5 mg         15      7.5 mg         16            17                  18               19               20               21               22               23               24                 25               26               27               28               29               30               31                Date Details   No additional details    Date of next INR:  3/16/2018         How to take your warfarin dose     To take:  3.75 mg Take 0.5 of a 7.5 mg tablet.    To take:  7.5 mg Take 1 of the 7.5 mg tablets.

## 2018-02-17 LAB
ALBUMIN SERPL-MCNC: 4 G/DL (ref 3.4–5)
ALP SERPL-CCNC: 79 U/L (ref 40–150)
ALT SERPL W P-5'-P-CCNC: 28 U/L (ref 0–70)
ANION GAP SERPL CALCULATED.3IONS-SCNC: 5 MMOL/L (ref 3–14)
AST SERPL W P-5'-P-CCNC: 32 U/L (ref 0–45)
BILIRUB SERPL-MCNC: 0.5 MG/DL (ref 0.2–1.3)
BUN SERPL-MCNC: 18 MG/DL (ref 7–30)
CALCIUM SERPL-MCNC: 9.2 MG/DL (ref 8.5–10.1)
CHLORIDE SERPL-SCNC: 107 MMOL/L (ref 94–109)
CO2 SERPL-SCNC: 29 MMOL/L (ref 20–32)
CREAT SERPL-MCNC: 0.97 MG/DL (ref 0.66–1.25)
GFR SERPL CREATININE-BSD FRML MDRD: 77 ML/MIN/1.7M2
GLUCOSE SERPL-MCNC: 97 MG/DL (ref 70–99)
POTASSIUM SERPL-SCNC: 4.4 MMOL/L (ref 3.4–5.3)
PROT SERPL-MCNC: 7.2 G/DL (ref 6.8–8.8)
SODIUM SERPL-SCNC: 141 MMOL/L (ref 133–144)
TSH SERPL DL<=0.005 MIU/L-ACNC: 3.07 MU/L (ref 0.4–4)
URATE SERPL-MCNC: 7.3 MG/DL (ref 3.5–7.2)

## 2018-02-19 LAB — DEPRECATED CALCIDIOL+CALCIFEROL SERPL-MC: 28 UG/L (ref 20–75)

## 2018-02-20 ENCOUNTER — TELEPHONE (OUTPATIENT)
Dept: INTERNAL MEDICINE | Facility: CLINIC | Age: 63
End: 2018-02-20

## 2018-02-20 NOTE — TELEPHONE ENCOUNTER
Reason for Call:  Questions on test results:    Name of test or procedure: unknown    Date of test of procedure:2/16    Location of the test or procedure: [facility] to leave the result message on voice mail or with a family member? YES    Phone number Patient can be reached at:  Home number on file [phone] (home) please call this afternoon    Additional comments: none    Call taken on 2/20/2018 at 8:51 AM by Idalmis Roy

## 2018-02-20 NOTE — TELEPHONE ENCOUNTER
Unable to speak with Pt. MA called this morning and Left detailed message for test results.  Obdulia Kamara MA

## 2018-02-21 ENCOUNTER — MYC MEDICAL ADVICE (OUTPATIENT)
Dept: INTERNAL MEDICINE | Facility: CLINIC | Age: 63
End: 2018-02-21

## 2018-02-22 NOTE — TELEPHONE ENCOUNTER
[facility] Sprague's note for 2/16/18 labs.

## 2018-03-01 ENCOUNTER — TRANSFERRED RECORDS (OUTPATIENT)
Dept: HEALTH INFORMATION MANAGEMENT | Facility: CLINIC | Age: 63
End: 2018-03-01

## 2018-03-09 ENCOUNTER — ANTICOAGULATION THERAPY VISIT (OUTPATIENT)
Dept: ANTICOAGULATION | Facility: CLINIC | Age: 63
End: 2018-03-09
Payer: COMMERCIAL

## 2018-03-09 DIAGNOSIS — Z95.2 HEART VALVE REPLACED: ICD-10-CM

## 2018-03-09 DIAGNOSIS — Z79.01 LONG-TERM (CURRENT) USE OF ANTICOAGULANTS: ICD-10-CM

## 2018-03-09 LAB — INR POINT OF CARE: 3.3 (ref 0.86–1.14)

## 2018-03-09 PROCEDURE — 99207 ZZC NO CHARGE NURSE ONLY: CPT

## 2018-03-09 PROCEDURE — 85610 PROTHROMBIN TIME: CPT | Mod: QW

## 2018-03-09 PROCEDURE — 36416 COLLJ CAPILLARY BLOOD SPEC: CPT

## 2018-03-09 NOTE — PROGRESS NOTES
"  ANTICOAGULATION FOLLOW-UP CLINIC VISIT    Patient Name:  Matthias Cueto  Date:  3/9/2018  Contact Type:  Face to Face    SUBJECTIVE:     Patient Findings     Positives Change in medications (Pt hurt his back recently and was on a \"steroid and pain medication.\"  Last dose was 2 days ago.  Continues to have mild back pain.)           OBJECTIVE    INR Protime   Date Value Ref Range Status   03/09/2018 3.3 (A) 0.86 - 1.14 Final       ASSESSMENT / PLAN  INR assessment THER    Recheck INR In: 6 WEEKS    INR Location Clinic      Anticoagulation Summary as of 3/9/2018     INR goal 2.5-3.5   Today's INR 3.3   Maintenance plan 3.75 mg (7.5 mg x 0.5) on Tue, Sat; 7.5 mg (7.5 mg x 1) all other days   Full instructions 3.75 mg on Tue, Sat; 7.5 mg all other days   Weekly total 45 mg   No change documented Ashleigh Lopez RN   Plan last modified Lina Benito RN (4/18/2016)   Next INR check 4/20/2018   Priority INR   Target end date     Indications   Long-term (current) use of anticoagulants [Z79.01] [Z79.01]  Heart valve replaced [Z95.2] [Z95.2]         Anticoagulation Episode Summary     INR check location     Preferred lab     Send INR reminders to [facility]    Comments       Anticoagulation Care Providers     Provider Role Specialty Phone number    Porfirio Sprague MD [facility] Internal Medicine [phone]            See the Encounter Report to view Anticoagulation Flowsheet and Dosing Calendar (Go to Encounters tab in chart review, and find the Anticoagulation Therapy Visit)    Dosage adjustment made based on physician directed care plan.    Ashleigh Lopez RN               "

## 2018-03-09 NOTE — MR AVS SNAPSHOT
Matthias Cueto   3/9/2018 11:00 AM   Anticoagulation Therapy Visit    Description:  62 year old male   Provider[facility]   Department:  [facility]           INR as of 3/9/2018     Today's INR 3.3      Anticoagulation Summary as of 3/9/2018     INR goal 2.5-3.5   Today's INR 3.3   Full instructions 3.75 mg on Tue, Sat; 7.5 mg all other days   Next INR check 4/20/2018    Indications   Long-term (current) use of anticoagulants [Z79.01] [Z79.01]  Heart valve replaced [Z95.2] [Z95.2]         Contact Numbers     [facility] Phone Numbers:  Anticoagulation Clinic Appointments : [phone]  Anticoagulation Nurse: [phone]         March 2018 Details    Sun Mon Tue Wed Thu Fri Sat         1               2               3                 4               5               6               7               8               9      7.5 mg   See details      10      3.75 mg           11      7.5 mg         12      7.5 mg         13      3.75 mg         14      7.5 mg         15      7.5 mg         16      7.5 mg         17      3.75 mg           18      7.5 mg         19      7.5 mg         20      3.75 mg         21      7.5 mg         22      7.5 mg         23      7.5 mg         24      3.75 mg           25      7.5 mg         26      7.5 mg         27      3.75 mg         28      7.5 mg         29      7.5 mg         30      7.5 mg         31      3.75 mg          Date Details   03/09 This INR check               How to take your warfarin dose     To take:  3.75 mg Take 0.5 of a 7.5 mg tablet.    To take:  7.5 mg Take 1 of the 7.5 mg tablets.           April 2018 Details    Sun Mon Tue Wed Thu Fri Sat     1      7.5 mg         2      7.5 mg         3      3.75 mg         4      7.5 mg         5      7.5 mg         6      7.5 mg         7      3.75 mg           8      7.5 mg         9      7.5 mg         10      3.75 mg         11      7.5 mg         12      7.5 mg         13      7.5 mg          14      3.75 mg           15      7.5 mg         16      7.5 mg         17      3.75 mg         18      7.5 mg         19      7.5 mg         20            21                 22               23               24               25               26               27               28                 29               30                     Date Details   No additional details    Date of next INR:  4/20/2018         How to take your warfarin dose     To take:  3.75 mg Take 0.5 of a 7.5 mg tablet.    To take:  7.5 mg Take 1 of the 7.5 mg tablets.

## 2018-03-30 ENCOUNTER — TRANSFERRED RECORDS (OUTPATIENT)
Dept: HEALTH INFORMATION MANAGEMENT | Facility: CLINIC | Age: 63
End: 2018-03-30

## 2018-04-10 DIAGNOSIS — E63.9 NUTRITION DISORDER: ICD-10-CM

## 2018-04-11 RX ORDER — FOLIC ACID-PYRIDOXINE-CYANOCOBALAMIN TAB 2.5-25-2 MG 2.5-25-2 MG
TAB ORAL
Qty: 90 TABLET | Refills: 1 | Status: SHIPPED | OUTPATIENT
Start: 2018-04-11

## 2018-05-03 ENCOUNTER — ANTICOAGULATION THERAPY VISIT (OUTPATIENT)
Dept: ANTICOAGULATION | Facility: CLINIC | Age: 63
End: 2018-05-03

## 2018-05-03 DIAGNOSIS — Z79.01 LONG-TERM (CURRENT) USE OF ANTICOAGULANTS: ICD-10-CM

## 2018-05-03 DIAGNOSIS — Z95.2 HEART VALVE REPLACED: ICD-10-CM

## 2018-06-08 ENCOUNTER — TRANSFERRED RECORDS (OUTPATIENT)
Dept: HEALTH INFORMATION MANAGEMENT | Facility: CLINIC | Age: 63
End: 2018-06-08

## 2019-11-03 ENCOUNTER — HEALTH MAINTENANCE LETTER (OUTPATIENT)
Age: 64
End: 2019-11-03

## 2020-02-10 ENCOUNTER — HEALTH MAINTENANCE LETTER (OUTPATIENT)
Age: 65
End: 2020-02-10

## 2020-07-17 ENCOUNTER — TRANSFERRED RECORDS (OUTPATIENT)
Dept: HEALTH INFORMATION MANAGEMENT | Facility: CLINIC | Age: 65
End: 2020-07-17

## 2020-08-06 ENCOUNTER — TRANSFERRED RECORDS (OUTPATIENT)
Dept: HEALTH INFORMATION MANAGEMENT | Facility: CLINIC | Age: 65
End: 2020-08-06

## 2020-11-16 ENCOUNTER — HEALTH MAINTENANCE LETTER (OUTPATIENT)
Age: 65
End: 2020-11-16

## 2021-04-03 ENCOUNTER — HEALTH MAINTENANCE LETTER (OUTPATIENT)
Age: 66
End: 2021-04-03

## 2021-09-18 ENCOUNTER — HEALTH MAINTENANCE LETTER (OUTPATIENT)
Age: 66
End: 2021-09-18

## 2022-04-30 ENCOUNTER — HEALTH MAINTENANCE LETTER (OUTPATIENT)
Age: 67
End: 2022-04-30

## 2022-11-19 ENCOUNTER — HEALTH MAINTENANCE LETTER (OUTPATIENT)
Age: 67
End: 2022-11-19

## 2023-05-26 ENCOUNTER — TRANSFERRED RECORDS (OUTPATIENT)
Dept: HEALTH INFORMATION MANAGEMENT | Facility: CLINIC | Age: 68
End: 2023-05-26

## 2023-09-10 ENCOUNTER — HEALTH MAINTENANCE LETTER (OUTPATIENT)
Age: 68
End: 2023-09-10

## 2024-07-19 NOTE — PROGRESS NOTES
ANTICOAGULATION FOLLOW-UP CLINIC VISIT    Patient Name:  Matthias Cueto  Date:  1/9/2018  Contact Type:  Face to Face    SUBJECTIVE:     Patient Findings     Positives No Problem Findings           OBJECTIVE    INR Protime   Date Value Ref Range Status   01/09/2018 3.4 (A) 0.86 - 1.14 Final       ASSESSMENT / PLAN  INR assessment THER    Recheck INR In: 6 WEEKS    INR Location Clinic      Anticoagulation Summary as of 1/9/2018     INR goal 2.5-3.5   Today's INR 3.4   Maintenance plan 3.75 mg (7.5 mg x 0.5) on Tue, Sat; 7.5 mg (7.5 mg x 1) all other days   Full instructions 3.75 mg on Tue, Sat; 7.5 mg all other days   Weekly total 45 mg   No change documented Ashleigh Lopez RN   Plan last modified Lina Benito RN (4/18/2016)   Next INR check 2/20/2018   Priority INR   Target end date     Indications   Long-term (current) use of anticoagulants [Z79.01] [Z79.01]  Heart valve replaced [Z95.2] [Z95.2]         Anticoagulation Episode Summary     INR check location     Preferred lab     Send INR reminders to [facility]    Comments       Anticoagulation Care Providers     Provider Role Specialty Phone number    Porfirio Sprague MD [facility] Internal Medicine [phone]            See the Encounter Report to view Anticoagulation Flowsheet and Dosing Calendar (Go to Encounters tab in chart review, and find the Anticoagulation Therapy Visit)    Dosage adjustment made based on physician directed care plan.    Ashleigh Lopez RN               
5 = Markedly ill - intrusive symptoms that distinctly impair social/occupational function or cause intrusive levels of distress
3 = Mildly ill – clearly established symptoms with minimal, if any, distress or difficulty in social and occupational function
3 = Mildly ill – clearly established symptoms with minimal, if any, distress or difficulty in social and occupational function

## 2025-08-01 ENCOUNTER — TRANSFERRED RECORDS (OUTPATIENT)
Dept: HEALTH INFORMATION MANAGEMENT | Facility: CLINIC | Age: 70
End: 2025-08-01

## 2025-08-15 ENCOUNTER — TRANSFERRED RECORDS (OUTPATIENT)
Dept: HEALTH INFORMATION MANAGEMENT | Facility: CLINIC | Age: 70
End: 2025-08-15

## (undated) DEVICE — KIT ENDO TURNOVER/PROCEDURE W/CLEAN A SCOPE LINERS 103888

## (undated) RX ORDER — FENTANYL CITRATE 50 UG/ML
INJECTION, SOLUTION INTRAMUSCULAR; INTRAVENOUS
Status: DISPENSED
Start: 2017-08-15